# Patient Record
Sex: MALE | Race: WHITE | Employment: OTHER | URBAN - NONMETROPOLITAN AREA
[De-identification: names, ages, dates, MRNs, and addresses within clinical notes are randomized per-mention and may not be internally consistent; named-entity substitution may affect disease eponyms.]

---

## 2018-04-02 ENCOUNTER — HOSPITAL ENCOUNTER (OUTPATIENT)
Dept: MRI IMAGING | Facility: HOSPITAL | Age: 43
Discharge: HOME | End: 2018-04-02
Attending: FAMILY MEDICINE | Admitting: FAMILY MEDICINE
Payer: COMMERCIAL

## 2018-04-02 DIAGNOSIS — M54.50 LOW BACK PAIN: ICD-10-CM

## 2018-04-02 PROCEDURE — 72148 MRI LUMBAR SPINE W/O DYE: CPT | Mod: TC

## 2018-04-11 ENCOUNTER — TELEPHONE (OUTPATIENT)
Dept: INTERVENTIONAL RADIOLOGY/VASCULAR | Facility: HOSPITAL | Age: 43
End: 2018-04-11

## 2018-04-11 NOTE — TELEPHONE ENCOUNTER
STEROID INJECTION REMINDER CALL    Called to remind patient of appointment on 04/13/2018 at 1430    Patient has not had a flu shot in the last two weeks.  Patient has not had immunizations in the last two weeks.  Patient has not had a steroid injection in the last two weeks.  Patient is not currently on antibiotics.    Discussed after care, and explained that a  needs to accompany patient to these appointments.  Patient verbalized an understanding of the  requirement.

## 2018-04-13 ENCOUNTER — HOSPITAL ENCOUNTER (OUTPATIENT)
Dept: INTERVENTIONAL RADIOLOGY/VASCULAR | Facility: HOSPITAL | Age: 43
Discharge: HOME | End: 2018-04-13
Attending: FAMILY MEDICINE | Admitting: FAMILY MEDICINE
Payer: COMMERCIAL

## 2018-04-13 DIAGNOSIS — M51.369 DEGENERATION OF LUMBAR INTERVERTEBRAL DISC: ICD-10-CM

## 2018-04-13 PROCEDURE — 25000128 H RX IP 250 OP 636: Performed by: RADIOLOGY

## 2018-04-13 PROCEDURE — C1751 CATH, INF, PER/CENT/MIDLINE: HCPCS | Mod: TC

## 2018-04-13 RX ORDER — IOPAMIDOL 612 MG/ML
15 INJECTION, SOLUTION INTRATHECAL ONCE
Status: COMPLETED | OUTPATIENT
Start: 2018-04-13 | End: 2018-04-13

## 2018-04-13 RX ORDER — METHYLPREDNISOLONE ACETATE 80 MG/ML
INJECTION, SUSPENSION INTRA-ARTICULAR; INTRALESIONAL; INTRAMUSCULAR; SOFT TISSUE
Status: DISCONTINUED
Start: 2018-04-13 | End: 2018-04-14 | Stop reason: HOSPADM

## 2018-04-13 RX ORDER — METHYLPREDNISOLONE ACETATE 80 MG/ML
80 INJECTION, SUSPENSION INTRA-ARTICULAR; INTRALESIONAL; INTRAMUSCULAR; SOFT TISSUE ONCE
Status: COMPLETED | OUTPATIENT
Start: 2018-04-13 | End: 2018-04-13

## 2018-04-13 RX ADMIN — IOPAMIDOL 6 ML: 612 INJECTION, SOLUTION INTRATHECAL at 14:59

## 2018-04-13 RX ADMIN — METHYLPREDNISOLONE ACETATE 80 MG: 80 INJECTION, SUSPENSION INTRA-ARTICULAR; INTRALESIONAL; INTRAMUSCULAR; SOFT TISSUE at 14:59

## 2018-04-13 NOTE — IP AVS SNAPSHOT
[facility]    [address] [address]    Phone:  [phone]    Fax:  [phone]                                       After Visit Summary   4/13/2018    Samuel Bravo    MRN: [id number]           After Visit Summary Signature Page     I have received my discharge instructions, and my questions have been answered. I have discussed any challenges I see with this plan with the nurse or doctor.    ..........................................................................................................................................  Patient/Patient Representative Signature      ..........................................................................................................................................  Patient Representative Print Name and Relationship to Patient    ..................................................               ................................................  Date                                            Time    ..........................................................................................................................................  Reviewed by Signature/Title    ...................................................              ..............................................  Date                                                            Time

## 2018-04-13 NOTE — IP AVS SNAPSHOT
MRN:[id number]                      After Visit Summary   4/13/2018    Samuel Bravo    MRN: [id number]           Visit Information        Provider Department      4/13/2018  2:30 PM HIIRRAD; HIIR1 [facility]           Review of your medicines      UNREVIEWED medicines. Ask your doctor about these medicines        Dose / Directions    albuterol 108 (90 Base) MCG/ACT Inhaler   Commonly known as:  PROAIR HFA/PROVENTIL HFA/VENTOLIN HFA   Used for:  Bronchitis        Dose:  2 puff   Inhale 2 puffs into the lungs every 6 hours as needed for shortness of breath / dyspnea   Quantity:  1 Inhaler   Refills:  0       azithromycin 250 MG tablet   Commonly known as:  ZITHROMAX   Used for:  Bronchitis        Two tablets first day, then one tablet daily for four days.   Quantity:  6 tablet   Refills:  0       benzonatate 200 MG capsule   Commonly known as:  TESSALON   Used for:  Bronchitis        Dose:  200 mg   Take 1 capsule (200 mg) by mouth 3 times daily as needed for cough   Quantity:  21 capsule   Refills:  0       MULTIVITAMIN PO        Dose:  1 tablet   Take 1 tablet by mouth daily with food   Refills:  0       valACYclovir 500 MG tablet   Commonly known as:  VALTREX   Used for:  Herpes simplex virus infection        TAKE 1 TABLET TWICE A DAY BY MOUTH - GENERIC FOR VALTREX   Quantity:  60 tablet   Refills:  0                Protect others around you: Learn how to safely use, store and throw away your medicines at www.disposemymeds.org.         Follow-ups after your visit         Care Instructions        Further instructions from your care team       Home number on file [phone] (home)  Is it ok to leave a message at this number(s)? Yes    Dr. Lopez completed your procedure on 4/13/2018.    Current Pain Level (0-10 Scale): 4/10  Post Pain Level (0-10):  2/10    Radiology Discharge instructions for Steroid Injection    Activity Level:     Do not do any heavy activity or exercise for  "24 hours.   Do not drive for 4 hours after your injection.  Diet:   Return to your normal diet.  Medications:   If you have stopped taking your Aspirin, Coumadin/Warfarin, Ibuprofen, or any   other blood thinner for this procedure you may resume in the morning unless   your primary care provider has given you other instructions.    Diabetics may see an increase in blood sugar after steroid injections. If you are concerned about your blood sugar, please contact your family doctor.    Site Care:  Remove the bandage and bathe or shower the morning after the procedure.      Please allow two weeks to experience improvement in your pain.  If you have any further issues, please contact your provider.    Call your Primary Care Provider if you have the following (if your primary care provider is not available please seek emergency care):   Nausea with vomiting   Severe headache   Drowsiness or confusion   Redness or drainage at the injection or puncture site   Temperature over 101 degrees F   Other concerns   Worsening back pain   Stiff neck           Additional Information About Your Visit        [email or web address] Information     [employer] lets you send messages to your doctor, view your test results, renew your prescriptions, schedule appointments and more. To sign up, go to [email or web address] . Click on \"Log in\" on the left side of the screen, which will take you to the Welcome page. Then click on \"Sign up Now\" on the right side of the page.     You will be asked to enter the access code listed below, as well as some personal information. Please follow the directions to create your username and password.     Your access code is: [email or web address]  Expires: 2018  3:00 PM     Your access code will  in 90 days. If you need help or a new code, please call your [place] clinic or [phone].        Care EveryWhere ID     This is your Care EveryWhere ID. This could be used by other organizations to access your [place] medical " records  [id number]         Primary Care Provider Office Phone # Fax #    Benjie Ramos [phone]      Equal Access to Services     CLEMENT FLORES : Sarthak lupe mchugh sheryl Stahl, aidan rocíodavin, guanakito kaoctavio dominguez, [place] mejiapiyush sydnee. So [facility] [phone].    ATENCIÓN: Si habla español, tiene a agrawal disposición servicios gratuitos de asistencia lingüística. Llame al [phone].    We comply with applicable federal civil rights laws and [place] laws. We do not discriminate on the basis of race, color, national origin, age, disability, sex, sexual orientation, or gender identity.            Thank you!     Thank you for choosing [facility] for your care. Our goal is always to provide you with excellent care. Hearing back from our patients is one way we can continue to improve our services. Please take a few minutes to complete the written survey that you may receive in the mail after you visit with us. Thank you!             Medication List: This is a list of all your medications and when to take them. Check marks below indicate your daily home schedule. Keep this list as a reference.      Medications           Morning Afternoon Evening Bedtime As Needed    albuterol 108 (90 Base) MCG/ACT Inhaler   Commonly known as:  PROAIR HFA/PROVENTIL HFA/VENTOLIN HFA   Inhale 2 puffs into the lungs every 6 hours as needed for shortness of breath / dyspnea                                azithromycin 250 MG tablet   Commonly known as:  ZITHROMAX   Two tablets first day, then one tablet daily for four days.                                benzonatate 200 MG capsule   Commonly known as:  TESSALON   Take 1 capsule (200 mg) by mouth 3 times daily as needed for cough                                MULTIVITAMIN PO   Take 1 tablet by mouth daily with food                                valACYclovir 500 MG tablet   Commonly known as:  VALTREX   TAKE 1 TABLET TWICE A DAY BY MOUTH - GENERIC  FOR VALTREX

## 2018-04-13 NOTE — PROGRESS NOTES
Injection Documentation of Provider History    PER-PROVIDER HISTORY, Dr. HARRIS:  Is this for treatment of acute herpes zoster, post herpetic neuralgia, post-decompressive radiculitis, or post-surgical scarring?   No  Does the patient have radiculopathy or sciatica?  Yes  How long has the patient had any physical therapy, home therapy or chiropractor care?   YES.  How long has the patient taken NSaids or muscle relaxants?  NO .  Is there any history of systemic/local infection or unstable medical conditions?  No

## 2018-04-13 NOTE — DISCHARGE INSTRUCTIONS
Home number on file [phone] (home)  Is it ok to leave a message at this number(s)? Yes    Dr. Lopez completed your procedure on 4/13/2018.    Current Pain Level (0-10 Scale): 4/10  Post Pain Level (0-10):  2/10    Radiology Discharge instructions for Steroid Injection    Activity Level:     Do not do any heavy activity or exercise for 24 hours.   Do not drive for 4 hours after your injection.  Diet:   Return to your normal diet.  Medications:   If you have stopped taking your Aspirin, Coumadin/Warfarin, Ibuprofen, or any   other blood thinner for this procedure you may resume in the morning unless   your primary care provider has given you other instructions.    Diabetics may see an increase in blood sugar after steroid injections. If you are concerned about your blood sugar, please contact your family doctor.    Site Care:  Remove the bandage and bathe or shower the morning after the procedure.      Please allow two weeks to experience improvement in your pain.  If you have any further issues, please contact your provider.    Call your Primary Care Provider if you have the following (if your primary care provider is not available please seek emergency care):   Nausea with vomiting   Severe headache   Drowsiness or confusion   Redness or drainage at the injection or puncture site   Temperature over 101 degrees F   Other concerns   Worsening back pain   Stiff neck

## 2018-04-27 ENCOUNTER — TELEPHONE (OUTPATIENT)
Dept: INTERVENTIONAL RADIOLOGY/VASCULAR | Facility: HOSPITAL | Age: 43
End: 2018-04-27

## 2018-04-27 NOTE — TELEPHONE ENCOUNTER
INJECTION POST CALL    Procedure: BUBBA TL L3-4  Radiologist(s): Dr. Ld Lopez  Date of Procedure: 04/13/2018    I responded to the patient's questions/concerns.    Pre-procedure pain score was: 4 (See pre-procedure score)  Post-procedure pain score as of today is: 2  Percentage of pain reduction: 50%  Where is the pain? Lower back  Is the pain radiating? No, Patient states prior to the injection he had pain down both legs but that has completely resolved following the injection.  Is this new pain? No    Patient would like to pursue another injection, if recommended.     Patient will contact provider, Benjie Ramos if there are any issues.    GEORGIE PEREYRA

## 2018-05-25 ENCOUNTER — HOSPITAL ENCOUNTER (OUTPATIENT)
Dept: MRI IMAGING | Facility: HOSPITAL | Age: 43
Discharge: HOME | End: 2018-05-25
Attending: FAMILY MEDICINE | Admitting: FAMILY MEDICINE
Payer: COMMERCIAL

## 2018-05-25 DIAGNOSIS — M54.2 NECK PAIN: ICD-10-CM

## 2018-05-25 PROCEDURE — 72141 MRI NECK SPINE W/O DYE: CPT | Mod: TC

## 2018-07-11 ENCOUNTER — HOSPITAL ENCOUNTER (OUTPATIENT)
Dept: MRI IMAGING | Facility: HOSPITAL | Age: 43
Discharge: HOME | End: 2018-07-11
Attending: FAMILY MEDICINE | Admitting: FAMILY MEDICINE
Payer: COMMERCIAL

## 2018-07-11 DIAGNOSIS — M54.6 THORACIC BACK PAIN: ICD-10-CM

## 2018-07-11 DIAGNOSIS — M54.9 BACK PAIN DUE TO INJURY: ICD-10-CM

## 2018-07-11 PROCEDURE — 72146 MRI CHEST SPINE W/O DYE: CPT | Mod: TC

## 2018-07-27 ENCOUNTER — TELEPHONE (OUTPATIENT)
Dept: INTERVENTIONAL RADIOLOGY/VASCULAR | Facility: HOSPITAL | Age: 43
End: 2018-07-27

## 2018-07-27 NOTE — TELEPHONE ENCOUNTER
STEROID INJECTION REMINDER CALL    Called to remind patient of appointment on 07/30/2018 at 1330    Patient has not had a flu shot in the last two weeks.  Patient has not had immunizations in the last two weeks.  Patient has not had a steroid injection in the last two weeks.  Patient has not taken antibiotics in the past two weeks.    Discussed after care, and explained that a  needs to accompany patient to these appointments.  Patient verbalized an understanding of the  requirement.    Reminded the patient not to take any pain medication the day of the injection. Patient verbalized understanding.

## 2018-07-30 ENCOUNTER — HOSPITAL ENCOUNTER (OUTPATIENT)
Dept: INTERVENTIONAL RADIOLOGY/VASCULAR | Facility: HOSPITAL | Age: 43
Discharge: HOME | End: 2018-07-30
Attending: FAMILY MEDICINE | Admitting: FAMILY MEDICINE
Payer: COMMERCIAL

## 2018-07-30 DIAGNOSIS — M50.30 DDD (DEGENERATIVE DISC DISEASE), CERVICAL: ICD-10-CM

## 2018-07-30 PROCEDURE — 25000128 H RX IP 250 OP 636: Performed by: RADIOLOGY

## 2018-07-30 PROCEDURE — 62321 NJX INTERLAMINAR CRV/THRC: CPT | Mod: TC

## 2018-07-30 RX ORDER — LIDOCAINE HYDROCHLORIDE 10 MG/ML
INJECTION, SOLUTION EPIDURAL; INFILTRATION; INTRACAUDAL; PERINEURAL
Status: DISCONTINUED
Start: 2018-07-30 | End: 2018-07-31 | Stop reason: HOSPADM

## 2018-07-30 RX ORDER — METHYLPREDNISOLONE ACETATE 80 MG/ML
INJECTION, SUSPENSION INTRA-ARTICULAR; INTRALESIONAL; INTRAMUSCULAR; SOFT TISSUE
Status: DISCONTINUED
Start: 2018-07-30 | End: 2018-07-31 | Stop reason: HOSPADM

## 2018-07-30 RX ORDER — METHYLPREDNISOLONE ACETATE 80 MG/ML
80 INJECTION, SUSPENSION INTRA-ARTICULAR; INTRALESIONAL; INTRAMUSCULAR; SOFT TISSUE ONCE
Status: COMPLETED | OUTPATIENT
Start: 2018-07-30 | End: 2018-07-30

## 2018-07-30 RX ORDER — LIDOCAINE HYDROCHLORIDE 10 MG/ML
5 INJECTION, SOLUTION EPIDURAL; INFILTRATION; INTRACAUDAL; PERINEURAL ONCE
Status: COMPLETED | OUTPATIENT
Start: 2018-07-30 | End: 2018-07-30

## 2018-07-30 RX ORDER — IOPAMIDOL 612 MG/ML
15 INJECTION, SOLUTION INTRATHECAL ONCE
Status: COMPLETED | OUTPATIENT
Start: 2018-07-30 | End: 2018-07-30

## 2018-07-30 RX ADMIN — METHYLPREDNISOLONE ACETATE 80 MG: 80 INJECTION, SUSPENSION INTRA-ARTICULAR; INTRALESIONAL; INTRAMUSCULAR; SOFT TISSUE at 14:23

## 2018-07-30 RX ADMIN — LIDOCAINE HYDROCHLORIDE 3 ML: 10 INJECTION, SOLUTION EPIDURAL; INFILTRATION; INTRACAUDAL; PERINEURAL at 14:23

## 2018-07-30 RX ADMIN — IOPAMIDOL 3 ML: 612 INJECTION, SOLUTION INTRATHECAL at 14:24

## 2018-07-30 NOTE — PROGRESS NOTES
Injection Documentation of Provider History    PER-PROVIDER HISTORY  Is this for treatment of acute herpes zoster, post herpetic neuralgia, post-decompressive radiculitis, or post-surgical scarring?   No  Does the patient have radiculopathy or sciatica?  No  How long has the patient had any physical therapy, home therapy or chiropractor care?  YES weeks.  How long has the patient taken NSaids or muscle relaxants?  YES weeks.  Is there any history of systemic/local infection or unstable medical conditions?  No

## 2018-07-30 NOTE — IP AVS SNAPSHOT
MRN:[id number]                      After Visit Summary   7/30/2018    Samuel Bravo    MRN: [id number]           Visit Information        Provider Department      7/30/2018  1:30 PM [facility]; HIIR1 [facility]           Review of your medicines      UNREVIEWED medicines. Ask your doctor about these medicines        Dose / Directions    albuterol 108 (90 Base) MCG/ACT Inhaler   Commonly known as:  PROAIR HFA/PROVENTIL HFA/VENTOLIN HFA   Used for:  Bronchitis        Dose:  2 puff   Inhale 2 puffs into the lungs every 6 hours as needed for shortness of breath / dyspnea   Quantity:  1 Inhaler   Refills:  0       azithromycin 250 MG tablet   Commonly known as:  ZITHROMAX   Used for:  Bronchitis        Two tablets first day, then one tablet daily for four days.   Quantity:  6 tablet   Refills:  0       benzonatate 200 MG capsule   Commonly known as:  TESSALON   Used for:  Bronchitis        Dose:  200 mg   Take 1 capsule (200 mg) by mouth 3 times daily as needed for cough   Quantity:  21 capsule   Refills:  0       MULTIVITAMIN PO        Dose:  1 tablet   Take 1 tablet by mouth daily with food   Refills:  0       valACYclovir 500 MG tablet   Commonly known as:  VALTREX   Used for:  Herpes simplex virus infection        TAKE 1 TABLET TWICE A DAY BY MOUTH - GENERIC FOR VALTREX   Quantity:  60 tablet   Refills:  0                Protect others around you: Learn how to safely use, store and throw away your medicines at www.disposemymeds.org.         Follow-ups after your visit        Your next 10 appointments already scheduled     Aug 13, 2018 11:30 AM CDT   XR LUMBAR TRANSLAMINAR INJECTION with HIIR1, HIIRRAD   [facility] ([facility] )    [address] [place] [address] [phone]           For nerve root injection, please send or bring copies of any MRIs or other scans you have had.  Bring a list of your current medicines to your exam. (Include  vitamins, minerals and over-the-counter medicines.) Leave your valuables at home.  Plan to have someone drive you home afterward.  Stop taking the following medicines (but talk to your doctor first):   If you take blood thinners, you may need to stop taking them a few days before treatment. Talk to your doctor before stopping these medicines.Stop taking Coumadin (warfarin) 3 days before treatment. Restart the day after treatment.   If you take Plavix, Ticlid, Pletal or Persantine, please ask your doctor if you should stop these medicines. You may need extra tests on the morning of your scan.   If you take Xarelto (Rivaroxaban), you may need to stop taking it 24 hours before treatment. Talk to your doctor before stopping this medicine. Restart the day after treatment.  You may take your other medicines as normal.  Stop all food and drink (including water) 3 hours before your test or treatment.  Please tell the doctor:   If you are allergic to X-ray dye (contrast fluid).   If you may be pregnant.  Injections take about 30 to 45 minutes. Most people spend up to 2 hours in the clinic or hospital.  Please call the Imaging Department at your exam site with any questions.               Care Instructions        Further instructions from your care team       Home number on file [phone] (home)  Is it ok to leave a message at this number(s)? Yes    Dr uHrd completed your procedure on 7/30/2018.    Current Pain Level (0-10 Scale): 8/10  Post Pain Level (0-10):  4/10    Radiology Discharge instructions for Steroid Injection    Activity Level:     Do not do any heavy activity or exercise for 24 hours.   Do not drive for 4 hours after your injection.  Diet:   Return to your normal diet.  Medications:   If you have stopped taking your Aspirin, Coumadin/Warfarin, Ibuprofen, or any   other blood thinner for this procedure you may resume in the morning unless   your primary care provider has given you other  instructions.    Diabetics may see an increase in blood sugar after steroid injections. If you are concerned about your blood sugar, please contact your family doctor.    Site Care:  Remove the bandage and bathe or shower the morning after the procedure.      Please allow two weeks to experience improvement in your pain.  If you have any further issues, please contact your provider.    Call your Primary Care Provider if you have the following (if your primary care provider is not available please seek emergency care):   Nausea with vomiting   Severe headache   Drowsiness or confusion   Redness or drainage at the injection or puncture site   Temperature over 101 degrees F   Other concerns   Worsening back pain   Stiff neck           Additional Information About Your Visit        Care EveryWhere ID     This is your Care EveryWhere ID. This could be used by other organizations to access your [place] medical records  [id number]         Primary Care Provider Office Phone # Fax #    Benjie Ramos [phone]      Equal Access to Services     [facility]: Hadii lupe mchugh hadasho Soomaali, waaxda luqadaha, qaybta kaalmada alberto, denver escalante . So [facility] [phone].    ATENCIÓN: Si habla español, tiene a agrawal disposición servicios gratuitos de asistencia lingüística. Chris al [phone].    We comply with applicable federal civil rights laws and [place] laws. We do not discriminate on the basis of race, color, national origin, age, disability, sex, sexual orientation, or gender identity.            Thank you!     Thank you for choosing [facility] for your care. Our goal is always to provide you with excellent care. Hearing back from our patients is one way we can continue to improve our services. Please take a few minutes to complete the written survey that you may receive in the mail after you visit with us. Thank you!             Medication List: This is a list of all  your medications and when to take them. Check marks below indicate your daily home schedule. Keep this list as a reference.      Medications           Morning Afternoon Evening Bedtime As Needed    albuterol 108 (90 Base) MCG/ACT Inhaler   Commonly known as:  PROAIR HFA/PROVENTIL HFA/VENTOLIN HFA   Inhale 2 puffs into the lungs every 6 hours as needed for shortness of breath / dyspnea                                azithromycin 250 MG tablet   Commonly known as:  ZITHROMAX   Two tablets first day, then one tablet daily for four days.                                benzonatate 200 MG capsule   Commonly known as:  TESSALON   Take 1 capsule (200 mg) by mouth 3 times daily as needed for cough                                MULTIVITAMIN PO   Take 1 tablet by mouth daily with food                                valACYclovir 500 MG tablet   Commonly known as:  VALTREX   TAKE 1 TABLET TWICE A DAY BY MOUTH - GENERIC FOR VALTREX

## 2018-07-30 NOTE — DISCHARGE INSTRUCTIONS
Home number on file [phone] (home)  Is it ok to leave a message at this number(s)? Yes    Dr Hurd completed your procedure on 7/30/2018.    Current Pain Level (0-10 Scale): 8/10  Post Pain Level (0-10):  4/10    Radiology Discharge instructions for Steroid Injection    Activity Level:     Do not do any heavy activity or exercise for 24 hours.   Do not drive for 4 hours after your injection.  Diet:   Return to your normal diet.  Medications:   If you have stopped taking your Aspirin, Coumadin/Warfarin, Ibuprofen, or any   other blood thinner for this procedure you may resume in the morning unless   your primary care provider has given you other instructions.    Diabetics may see an increase in blood sugar after steroid injections. If you are concerned about your blood sugar, please contact your family doctor.    Site Care:  Remove the bandage and bathe or shower the morning after the procedure.      Please allow two weeks to experience improvement in your pain.  If you have any further issues, please contact your provider.    Call your Primary Care Provider if you have the following (if your primary care provider is not available please seek emergency care):   Nausea with vomiting   Severe headache   Drowsiness or confusion   Redness or drainage at the injection or puncture site   Temperature over 101 degrees F   Other concerns   Worsening back pain   Stiff neck

## 2018-07-30 NOTE — IP AVS SNAPSHOT
[facility]    [address] [address]    Phone:  [phone]    Fax:  [phone]                                       After Visit Summary   7/30/2018    Samuel Bravo    MRN: [id number]           After Visit Summary Signature Page     I have received my discharge instructions, and my questions have been answered. I have discussed any challenges I see with this plan with the nurse or doctor.    ..........................................................................................................................................  Patient/Patient Representative Signature      ..........................................................................................................................................  Patient Representative Print Name and Relationship to Patient    ..................................................               ................................................  Date                                            Time    ..........................................................................................................................................  Reviewed by Signature/Title    ...................................................              ..............................................  Date                                                            Time

## 2018-08-01 ENCOUNTER — TRANSFERRED RECORDS (OUTPATIENT)
Dept: HEALTH INFORMATION MANAGEMENT | Facility: CLINIC | Age: 43
End: 2018-08-01

## 2018-08-14 ENCOUNTER — TELEPHONE (OUTPATIENT)
Dept: INTERVENTIONAL RADIOLOGY/VASCULAR | Facility: HOSPITAL | Age: 43
End: 2018-08-14

## 2018-08-14 NOTE — TELEPHONE ENCOUNTER
[unfilled]    INJECTION POST CALL    Procedure: BUBBA TL C7-T1  Radiologist(s): Dr. Fernando Hurd  Date of Procedure: 07/30/2018    I responded to the patient's questions/concerns.    Pre-procedure pain score was: 8 (See pre-procedure score)  Post-procedure pain score as of today is: 8  Percentage of pain reduction: 0%  Where is the pain? Neck  Is the pain radiating? Yes: into both shoulders  Is this new pain? No    Patient reports that he had partial relief for a week, but his pain has returned back to baseline.  Patient will contact the provider if there are any issues.      GEORGIE PEREYRA

## 2018-09-12 ENCOUNTER — HOSPITAL ENCOUNTER (OUTPATIENT)
Dept: INTERVENTIONAL RADIOLOGY/VASCULAR | Facility: HOSPITAL | Age: 43
Discharge: HOME | End: 2018-09-12
Attending: PHYSICAL MEDICINE & REHABILITATION | Admitting: PHYSICAL MEDICINE & REHABILITATION
Payer: COMMERCIAL

## 2018-09-12 DIAGNOSIS — M47.816 SPONDYLOSIS OF LUMBAR REGION WITHOUT MYELOPATHY OR RADICULOPATHY: ICD-10-CM

## 2018-09-12 DIAGNOSIS — G89.4 CHRONIC PAIN SYNDROME: ICD-10-CM

## 2018-09-12 PROCEDURE — 25000125 ZZHC RX 250: Performed by: RADIOLOGY

## 2018-09-12 PROCEDURE — 64493 INJ PARAVERT F JNT L/S 1 LEV: CPT | Mod: TC,50

## 2018-09-12 PROCEDURE — 25000128 H RX IP 250 OP 636: Performed by: RADIOLOGY

## 2018-09-12 RX ORDER — ROPIVACAINE HYDROCHLORIDE 10 MG/ML
INJECTION EPIDURAL; INFILTRATION; PERINEURAL
Status: DISCONTINUED
Start: 2018-09-12 | End: 2018-09-13 | Stop reason: HOSPADM

## 2018-09-12 RX ORDER — LIDOCAINE HYDROCHLORIDE 10 MG/ML
10 INJECTION, SOLUTION EPIDURAL; INFILTRATION; INTRACAUDAL; PERINEURAL ONCE
Status: COMPLETED | OUTPATIENT
Start: 2018-09-12 | End: 2018-09-12

## 2018-09-12 RX ORDER — LIDOCAINE HYDROCHLORIDE 10 MG/ML
INJECTION, SOLUTION EPIDURAL; INFILTRATION; INTRACAUDAL; PERINEURAL
Status: DISCONTINUED
Start: 2018-09-12 | End: 2018-09-13 | Stop reason: HOSPADM

## 2018-09-12 RX ORDER — ROPIVACAINE HYDROCHLORIDE 10 MG/ML
10 INJECTION EPIDURAL; INFILTRATION; PERINEURAL ONCE
Status: COMPLETED | OUTPATIENT
Start: 2018-09-12 | End: 2018-09-12

## 2018-09-12 RX ADMIN — ROPIVACAINE HYDROCHLORIDE 8 ML: 10 INJECTION, SOLUTION EPIDURAL at 14:00

## 2018-09-12 RX ADMIN — LIDOCAINE HYDROCHLORIDE 8 ML: 10 INJECTION, SOLUTION EPIDURAL; INFILTRATION; INTRACAUDAL; PERINEURAL at 13:59

## 2018-09-12 NOTE — IP AVS SNAPSHOT
[facility]    [address] [address]    Phone:  [phone]    Fax:  [phone]                                       After Visit Summary   9/12/2018    Samuel Bravo    MRN: [id number]           After Visit Summary Signature Page     I have received my discharge instructions, and my questions have been answered. I have discussed any challenges I see with this plan with the nurse or doctor.    ..........................................................................................................................................  Patient/Patient Representative Signature      ..........................................................................................................................................  Patient Representative Print Name and Relationship to Patient    ..................................................               ................................................  Date                                   Time    ..........................................................................................................................................  Reviewed by Signature/Title    ...................................................              ..............................................  Date                                               Time          22EPIC Rev 08/18

## 2018-09-12 NOTE — IP AVS SNAPSHOT
MRN:[id number]                      After Visit Summary   9/12/2018    Samuel rBavo    MRN: [id number]           Visit Information        Provider Department      9/12/2018  1:30 PM HIIRRAD; HIIR1 [facility]           Review of your medicines      UNREVIEWED medicines. Ask your doctor about these medicines        Dose / Directions    albuterol 108 (90 Base) MCG/ACT inhaler   Commonly known as:  PROAIR HFA/PROVENTIL HFA/VENTOLIN HFA   Used for:  Bronchitis        Dose:  2 puff   Inhale 2 puffs into the lungs every 6 hours as needed for shortness of breath / dyspnea   Quantity:  1 Inhaler   Refills:  0       azithromycin 250 MG tablet   Commonly known as:  ZITHROMAX   Used for:  Bronchitis        Two tablets first day, then one tablet daily for four days.   Quantity:  6 tablet   Refills:  0       benzonatate 200 MG capsule   Commonly known as:  TESSALON   Used for:  Bronchitis        Dose:  200 mg   Take 1 capsule (200 mg) by mouth 3 times daily as needed for cough   Quantity:  21 capsule   Refills:  0       MULTIVITAMIN PO        Dose:  1 tablet   Take 1 tablet by mouth daily with food   Refills:  0       valACYclovir 500 MG tablet   Commonly known as:  VALTREX   Used for:  Herpes simplex virus infection        TAKE 1 TABLET TWICE A DAY BY MOUTH - GENERIC FOR VALTREX   Quantity:  60 tablet   Refills:  0                Protect others around you: Learn how to safely use, store and throw away your medicines at www.disposemymeds.org.         Follow-ups after your visit        Your next 10 appointments already scheduled     Nov 16, 2018  7:30 AM CST   (Arrive by 7:15 AM)   XR LUMBAR RADIOFREQ ABLATION BILATERAL with HIIR1, HIXRRN, HIIRRAD   [facility] ([facility] )    [address] [place] [address] [id number] [phone]           How do I prepare for my exam? (Food and drink instructions) Stop all food and drink (including water) 3 hours before your test or  treatment.  How do I prepare for my exam? (Other instructions) Stop taking the following medicines (but talk to your doctor first): * If you take blood thinners, you may need to stop taking them a few days before treatment. Talk to your doctor before stopping these medicines. Stop taking Coumadin (warfarin) 3 days before treatment. Restart the day after treatment. * If you take Plavix, Ticlid, Pletal or Persantine, please ask your doctor if you should stop these medicines. You may need extra tests on the morning of your scan. * If you take Xarelto (Rivaroxaban), you may need to stop taking it 24 hours before treatment. Talk to your doctor before stopping this medicine. Restart the day after treatment.  You may take your other medicines as normal.  What should I wear: Wear comfortable clothes.  How long does the exam take: Injections take about 30 to 45 minutes. Most people spend up to 2 hours in the clinic or hospital.  What should I bring: For nerve root injection, please send or bring copies of any MRIs or other scans you have had. Please bring a list of your current medicines to your exam. Include vitamins, minerals and over-the-counter medicines.  Do I need a :  Plan to have someone drive you home afterward.  What do I need to tell my doctor: Tell your doctor if: * You have ever had an allergic reaction to X-ray dye (contrast fluid). * If there s any chance you are pregnant.  What should I do after the exam: We may ask you to lie down for a short time before you go home. If you had a nerve shot and your arm or leg feels numb, you will stay for up to two hours until the numbness wears off. You may return to your normal activities the next day.  What is this test: A spinal shot is done in or near the spine. You may receive steroid medicine (to reduce swelling) or contrast fluid (dye that makes the area show up more clearly on X-rays).  Who should I call with questions: If you have any questions, please call  "the Imaging Department where you will have your exam. Directions, parking instructions, and other information is available on our website, [email or web address].               Care Instructions        Further instructions from your care team       Medial Branch Blocks Workup Discharge Instructions    To obtain authorization for a Rhizotomy (Radiofrequency Ablation), we are required to document the percent of relief of the Medial Branch Block injection after one hour.  Therefore, please call and report this to us an hour after your procedure.  If no one is available to answer, it will go to voicemail:  Please leave your name, phone number, and the relief you have obtained from your injection.    Report your pain level between 0 and 10 with 10 being the worst pain ever.  Please do not leave a range of relief; we require a specific number between 0 and 10.  Without this information, your injection pain relief will not be documented as required for your insurance company. Calling is a crucial step of the Medial Branch Block injection and Rhizotomy workup.  Please call [phone] at 300 PM    Thank you,  The Interventional Care Team         Additional Information About Your Visit        [facility] Information     [facility] lets you send messages to your doctor, view your test results, renew your prescriptions, schedule appointments and more. To sign up, go to [email or web address] . Click on \"Log in\" on the left side of the screen, which will take you to the Welcome page. Then click on \"Sign up Now\" on the right side of the page.     You will be asked to enter the access code listed below, as well as some personal information. Please follow the directions to create your username and password.     Your access code is: [email or web address]  Expires: 2018  2:01 PM     Your access code will  in 90 days. If you need help or a new code, please call your [facility] clinic or [phone].        Care EveryWhere ID     This is " your Care EveryWhere ID. This could be used by other organizations to access your [facility] medical records  [id number]         Primary Care Provider Office Phone # Fax #    Benjie Ramos [phone]      Equal Access to Services     KENDALSHARON SANDRA : Hadii lupe ku stoneo Soosmaniali, waaxda luqadaha, qaybta kaalmada adejordanyada, denver mejiapiyush . So [facility] [phone].    ATENCIÓN: Si habla español, tiene a agrawal disposición servicios gratuitos de asistencia lingüística. Llame al [phone].    We comply with applicable federal civil rights laws and [place] laws. We do not discriminate on the basis of race, color, national origin, age, disability, sex, sexual orientation, or gender identity.            Thank you!     Thank you for choosing [facility] for your care. Our goal is always to provide you with excellent care. Hearing back from our patients is one way we can continue to improve our services. Please take a few minutes to complete the written survey that you may receive in the mail after you visit with us. Thank you!             Medication List: This is a list of all your medications and when to take them. Check marks below indicate your daily home schedule. Keep this list as a reference.      Medications           Morning Afternoon Evening Bedtime As Needed    albuterol 108 (90 Base) MCG/ACT inhaler   Commonly known as:  PROAIR HFA/PROVENTIL HFA/VENTOLIN HFA   Inhale 2 puffs into the lungs every 6 hours as needed for shortness of breath / dyspnea                                azithromycin 250 MG tablet   Commonly known as:  ZITHROMAX   Two tablets first day, then one tablet daily for four days.                                benzonatate 200 MG capsule   Commonly known as:  TESSALON   Take 1 capsule (200 mg) by mouth 3 times daily as needed for cough                                MULTIVITAMIN PO   Take 1 tablet by mouth daily with food                                 valACYclovir 500 MG tablet   Commonly known as:  VALTREX   TAKE 1 TABLET TWICE A DAY BY MOUTH - GENERIC FOR VALTREX

## 2018-09-12 NOTE — DISCHARGE INSTRUCTIONS
Medial Branch Blocks Workup Discharge Instructions    To obtain authorization for a Rhizotomy (Radiofrequency Ablation), we are required to document the percent of relief of the Medial Branch Block injection after one hour.  Therefore, please call and report this to us an hour after your procedure.  If no one is available to answer, it will go to voicemail:  Please leave your name, phone number, and the relief you have obtained from your injection.    Report your pain level between 0 and 10 with 10 being the worst pain ever.  Please do not leave a range of relief; we require a specific number between 0 and 10.  Without this information, your injection pain relief will not be documented as required for your insurance company. Calling is a crucial step of the Medial Branch Block injection and Rhizotomy workup.  Please call [phone] at 300 PM    Thank you,  The Interventional Care Team

## 2018-10-01 ENCOUNTER — TRANSFERRED RECORDS (OUTPATIENT)
Dept: HEALTH INFORMATION MANAGEMENT | Facility: HOSPITAL | Age: 43
End: 2018-10-01

## 2018-10-01 LAB
CREAT SERPL-MCNC: 0.89 MG/DL (ref 0.8–1.5)
GLUCOSE SERPL-MCNC: 90 MG/DL (ref 60–99)
INR PPP: 1 (ref 0.9–1.2)
POTASSIUM SERPL-SCNC: 4.1 MEQ/L (ref 3.5–5.1)

## 2018-10-05 ENCOUNTER — TELEPHONE (OUTPATIENT)
Dept: INTERVENTIONAL RADIOLOGY/VASCULAR | Facility: HOSPITAL | Age: 43
End: 2018-10-05

## 2018-10-05 NOTE — TELEPHONE ENCOUNTER
"Patient called to verify he received denial letter and ensure he knows not to come for his appointment on 10/9.  He thought that he was still on for 10/9.  He was explained to that need to reschedule him, giving him a \"soft date\" in November to keep him on the schedule until this either gets resolved or denial is final.  If it gets resolved we will try to move him up in the schedule.  He stated he understood.    "

## 2018-11-28 ENCOUNTER — TRANSFERRED RECORDS (OUTPATIENT)
Dept: HEALTH INFORMATION MANAGEMENT | Facility: CLINIC | Age: 43
End: 2018-11-28

## 2018-12-05 RX ORDER — METHYLPREDNISOLONE ACETATE 80 MG/ML
80 INJECTION, SUSPENSION INTRA-ARTICULAR; INTRALESIONAL; INTRAMUSCULAR; SOFT TISSUE ONCE
Status: CANCELLED | OUTPATIENT
Start: 2018-12-11

## 2018-12-05 RX ORDER — LIDOCAINE HYDROCHLORIDE 10 MG/ML
10 INJECTION, SOLUTION EPIDURAL; INFILTRATION; INTRACAUDAL; PERINEURAL ONCE
Status: CANCELLED | OUTPATIENT
Start: 2018-12-11

## 2018-12-11 ENCOUNTER — HOSPITAL ENCOUNTER (OUTPATIENT)
Dept: ULTRASOUND IMAGING | Facility: HOSPITAL | Age: 43
Discharge: HOME | End: 2018-12-11
Attending: NEUROLOGICAL SURGERY | Admitting: NEUROLOGICAL SURGERY
Payer: COMMERCIAL

## 2018-12-11 VITALS
HEART RATE: 95 BPM | SYSTOLIC BLOOD PRESSURE: 166 MMHG | TEMPERATURE: 97.9 F | DIASTOLIC BLOOD PRESSURE: 128 MMHG | RESPIRATION RATE: 16 BRPM

## 2018-12-11 DIAGNOSIS — M54.81 OCCIPITAL NEURALGIA OF RIGHT SIDE: ICD-10-CM

## 2018-12-11 PROCEDURE — 76942 ECHO GUIDE FOR BIOPSY: CPT | Mod: TC

## 2018-12-11 PROCEDURE — 25000125 ZZHC RX 250: Performed by: RADIOLOGY

## 2018-12-11 PROCEDURE — 25000125 ZZHC RX 250

## 2018-12-11 RX ORDER — METHYLPREDNISOLONE ACETATE 80 MG/ML
INJECTION, SUSPENSION INTRA-ARTICULAR; INTRALESIONAL; INTRAMUSCULAR; SOFT TISSUE
Status: COMPLETED
Start: 2018-12-11 | End: 2018-12-11

## 2018-12-11 RX ORDER — METHYLPREDNISOLONE ACETATE 80 MG/ML
80 INJECTION, SUSPENSION INTRA-ARTICULAR; INTRALESIONAL; INTRAMUSCULAR; SOFT TISSUE ONCE
Status: COMPLETED | OUTPATIENT
Start: 2018-12-11 | End: 2018-12-11

## 2018-12-11 RX ORDER — LIDOCAINE HYDROCHLORIDE 10 MG/ML
10 INJECTION, SOLUTION EPIDURAL; INFILTRATION; INTRACAUDAL; PERINEURAL ONCE
Status: COMPLETED | OUTPATIENT
Start: 2018-12-11 | End: 2018-12-11

## 2018-12-11 RX ADMIN — METHYLPREDNISOLONE ACETATE 80 MG: 80 INJECTION, SUSPENSION INTRA-ARTICULAR; INTRALESIONAL; INTRAMUSCULAR; SOFT TISSUE at 12:55

## 2018-12-11 RX ADMIN — LIDOCAINE HYDROCHLORIDE 6.5 ML: 10 INJECTION, SOLUTION EPIDURAL; INFILTRATION; INTRACAUDAL; PERINEURAL at 12:54

## 2018-12-11 NOTE — PROGRESS NOTES
Patients BP high. He said it elevates when he comes to doctor or procedures. Used two different BP cuffs with out change. He denied any dizziness, lightheaded, pain , or blurred vision. He will be checkinfg his BP at home. He said it goes down when talen at home. Explained to call nurses DI office if any concerns or to follow up with his primary if BP stays high.

## 2018-12-12 ENCOUNTER — TELEPHONE (OUTPATIENT)
Dept: INTERVENTIONAL RADIOLOGY/VASCULAR | Facility: HOSPITAL | Age: 43
End: 2018-12-12

## 2018-12-12 NOTE — TELEPHONE ENCOUNTER
Pt states he is feeling okay, rates his pain as a 4 on a scale of 1-10 will call in two weeks to check again

## 2019-01-30 ENCOUNTER — HOSPITAL ENCOUNTER (OUTPATIENT)
Dept: INTERVENTIONAL RADIOLOGY/VASCULAR | Facility: HOSPITAL | Age: 44
Discharge: HOME | End: 2019-01-30
Attending: NEUROLOGICAL SURGERY | Admitting: NEUROLOGICAL SURGERY
Payer: COMMERCIAL

## 2019-01-30 DIAGNOSIS — M47.812 CERVICAL FACET JOINT SYNDROME: ICD-10-CM

## 2019-01-30 PROCEDURE — 25000125 ZZHC RX 250: Performed by: RADIOLOGY

## 2019-01-30 PROCEDURE — 64490 INJ PARAVERT F JNT C/T 1 LEV: CPT | Mod: TC,50

## 2019-01-30 PROCEDURE — 25500064 ZZH RX 255 OP 636: Performed by: RADIOLOGY

## 2019-01-30 RX ORDER — METHYLPREDNISOLONE ACETATE 80 MG/ML
80 INJECTION, SUSPENSION INTRA-ARTICULAR; INTRALESIONAL; INTRAMUSCULAR; SOFT TISSUE ONCE
Status: COMPLETED | OUTPATIENT
Start: 2019-01-30 | End: 2019-01-30

## 2019-01-30 RX ORDER — METHYLPREDNISOLONE ACETATE 80 MG/ML
INJECTION, SUSPENSION INTRA-ARTICULAR; INTRALESIONAL; INTRAMUSCULAR; SOFT TISSUE
Status: DISCONTINUED
Start: 2019-01-30 | End: 2019-01-31 | Stop reason: HOSPADM

## 2019-01-30 RX ORDER — IOPAMIDOL 612 MG/ML
50 INJECTION, SOLUTION INTRAVASCULAR ONCE
Status: COMPLETED | OUTPATIENT
Start: 2019-01-30 | End: 2019-01-30

## 2019-01-30 RX ORDER — LIDOCAINE HYDROCHLORIDE 10 MG/ML
5 INJECTION, SOLUTION EPIDURAL; INFILTRATION; INTRACAUDAL; PERINEURAL ONCE
Status: COMPLETED | OUTPATIENT
Start: 2019-01-30 | End: 2019-01-30

## 2019-01-30 RX ADMIN — LIDOCAINE HYDROCHLORIDE 4 ML: 10 INJECTION, SOLUTION EPIDURAL; INFILTRATION; INTRACAUDAL at 15:23

## 2019-01-30 RX ADMIN — METHYLPREDNISOLONE ACETATE 80 MG: 80 INJECTION, SUSPENSION INTRA-ARTICULAR; INTRALESIONAL; INTRAMUSCULAR; SOFT TISSUE at 15:22

## 2019-01-30 RX ADMIN — IOPAMIDOL 1 ML: 612 INJECTION, SOLUTION INTRAVENOUS at 15:23

## 2019-01-30 NOTE — IP AVS SNAPSHOT
[facility]  [address] [address]  Phone:  [phone]  Fax:  [phone]                                    After Visit Summary   1/30/2019    Samuel Bravo    MRN: [id number]           After Visit Summary Signature Page    I have received my discharge instructions, and my questions have been answered. I have discussed any challenges I see with this plan with the nurse or doctor.    ..........................................................................................................................................  Patient/Patient Representative Signature      ..........................................................................................................................................  Patient Representative Print Name and Relationship to Patient    ..................................................               ................................................  Date                                   Time    ..........................................................................................................................................  Reviewed by Signature/Title    ...................................................              ..............................................  Date                                               Time          22EPIC Rev 08/18

## 2019-01-30 NOTE — DISCHARGE INSTRUCTIONS
Home number on file [phone] (home)  Is it ok to leave a message at this number(s)? Yes    Dr. Lopez completed your procedure on 1/30/2019.    Current Pain Level (0-10 Scale): 3/10  Post Pain Level (0-10):  0/10    Radiology Discharge instructions for Steroid Injection    Activity Level:     Do not do any heavy activity or exercise for 24 hours.   Do not drive for 4 hours after your injection.  Diet:   Return to your normal diet.  Medications:   If you have stopped taking your Aspirin, Coumadin/Warfarin, Ibuprofen, or any   other blood thinner for this procedure you may resume in the morning unless   your primary care provider has given you other instructions.    Diabetics may see an increase in blood sugar after steroid injections. If you are concerned about your blood sugar, please contact your family doctor.    Site Care:  Remove the bandage and bathe or shower the morning after the procedure.      Please allow two weeks to experience improvement in your pain.  If you have any further issues, please contact your provider.    Call your Primary Care Provider if you have the following (if your primary care provider is not available please seek emergency care):   Nausea with vomiting   Severe headache   Drowsiness or confusion   Redness or drainage at the injection or puncture site   Temperature over 101 degrees F   Other concerns   Worsening back pain   Stiff neck

## 2019-02-13 ENCOUNTER — TELEPHONE (OUTPATIENT)
Dept: INTERVENTIONAL RADIOLOGY/VASCULAR | Facility: HOSPITAL | Age: 44
End: 2019-02-13

## 2019-02-13 NOTE — TELEPHONE ENCOUNTER
INJECTION POST CALL    Procedure: Bilateral cervical facet c7-t1  Radiologist(s): Dr. Ld Lopez  Date of Procedure:  1-30-19    The patient was not available by telephone.  No answer. Left msg

## 2019-02-13 NOTE — TELEPHONE ENCOUNTER
INJECTION POST CALL    Procedure: BILATERAL CERVICAL FACET C7-T1  Radiologist(s): Dr. Ld Lopez  Date of Procedure:  1-30-19    The patient had no questions or concerns.    Pre-procedure pain score was: 3 (See pre-procedure score)  Post-procedure pain score as of today is: 0  Where is the pain located? PAIN FREE      Patient will contact the provider if there are any issues.      Angeles Rivas

## 2019-03-11 ENCOUNTER — HOSPITAL ENCOUNTER (OUTPATIENT)
Dept: INTERVENTIONAL RADIOLOGY/VASCULAR | Facility: HOSPITAL | Age: 44
Discharge: HOME | End: 2019-03-11
Attending: PHYSICAL MEDICINE & REHABILITATION | Admitting: PHYSICAL MEDICINE & REHABILITATION
Payer: COMMERCIAL

## 2019-03-11 DIAGNOSIS — M47.819 FACET ARTHROPATHY: ICD-10-CM

## 2019-03-11 DIAGNOSIS — M46.90 INFLAMMATORY SPONDYLOPATHY (H): ICD-10-CM

## 2019-03-11 PROCEDURE — 25000128 H RX IP 250 OP 636: Performed by: RADIOLOGY

## 2019-03-11 PROCEDURE — 25000125 ZZHC RX 250: Performed by: RADIOLOGY

## 2019-03-11 PROCEDURE — 62323 NJX INTERLAMINAR LMBR/SAC: CPT | Mod: TC

## 2019-03-11 RX ORDER — METHYLPREDNISOLONE ACETATE 80 MG/ML
INJECTION, SUSPENSION INTRA-ARTICULAR; INTRALESIONAL; INTRAMUSCULAR; SOFT TISSUE
Status: DISCONTINUED
Start: 2019-03-11 | End: 2019-03-12 | Stop reason: HOSPADM

## 2019-03-11 RX ORDER — IOPAMIDOL 612 MG/ML
15 INJECTION, SOLUTION INTRATHECAL ONCE
Status: COMPLETED | OUTPATIENT
Start: 2019-03-11 | End: 2019-03-11

## 2019-03-11 RX ORDER — METHYLPREDNISOLONE ACETATE 80 MG/ML
80 INJECTION, SUSPENSION INTRA-ARTICULAR; INTRALESIONAL; INTRAMUSCULAR; SOFT TISSUE ONCE
Status: COMPLETED | OUTPATIENT
Start: 2019-03-11 | End: 2019-03-11

## 2019-03-11 RX ADMIN — METHYLPREDNISOLONE ACETATE 80 MG: 80 INJECTION, SUSPENSION INTRA-ARTICULAR; INTRALESIONAL; INTRAMUSCULAR; SOFT TISSUE at 15:30

## 2019-03-11 RX ADMIN — IOPAMIDOL 6 ML: 612 INJECTION, SOLUTION INTRATHECAL at 15:31

## 2019-03-11 NOTE — DISCHARGE INSTRUCTIONS
Home number on file [phone] (home)  Is it ok to leave a message at this number(s)? Yes    Dr. Lopez completed your procedure on 3/11/2019.    Current Pain Level (0-10 Scale): 3/10  Post Pain Level (0-10):  0/10    Radiology Discharge instructions for Steroid Injection    Activity Level:     Do not do any heavy activity or exercise for 24 hours.   Do not drive for 4 hours after your injection.  Diet:   Return to your normal diet.  Medications:   If you have stopped taking your Aspirin, Coumadin/Warfarin, Ibuprofen, or any   other blood thinner for this procedure you may resume in the morning unless   your primary care provider has given you other instructions.    Diabetics may see an increase in blood sugar after steroid injections. If you are concerned about your blood sugar, please contact your family doctor.    Site Care:  Remove the bandage and bathe or shower the morning after the procedure.      Please allow two weeks to experience improvement in your pain.  If you have any further issues, please contact your provider.    Call your Primary Care Provider if you have the following (if your primary care provider is not available please seek emergency care):   Nausea with vomiting   Severe headache   Drowsiness or confusion   Redness or drainage at the injection or puncture site   Temperature over 101 degrees F   Other concerns   Worsening back pain   Stiff neck

## 2019-03-11 NOTE — IP AVS SNAPSHOT
[facility]  [address] [address]  Phone:  [phone]  Fax:  [phone]                                    After Visit Summary   3/11/2019    Samuel Bravo    MRN: [id number]           After Visit Summary Signature Page    I have received my discharge instructions, and my questions have been answered. I have discussed any challenges I see with this plan with the nurse or doctor.    ..........................................................................................................................................  Patient/Patient Representative Signature      ..........................................................................................................................................  Patient Representative Print Name and Relationship to Patient    ..................................................               ................................................  Date                                   Time    ..........................................................................................................................................  Reviewed by Signature/Title    ...................................................              ..............................................  Date                                               Time          22EPIC Rev 08/18

## 2019-03-25 ENCOUNTER — TELEPHONE (OUTPATIENT)
Dept: INTERVENTIONAL RADIOLOGY/VASCULAR | Facility: HOSPITAL | Age: 44
End: 2019-03-25

## 2019-03-25 NOTE — TELEPHONE ENCOUNTER
INJECTION POST CALL    Procedure: Epidural TL L3-4  Radiologist(s): Dr. Ld Lopez  Date of Procedure:  3/11/19    Pre-procedure pain score was: 3 (See pre-procedure score)  Post-procedure pain score as of today is: 0  Percentage of pain improvement today?  100%  Where is the pain located? No pain  Is this new pain? No  Would you like to be scheduled for an additional injection?  No      The patient had no questions or concerns.    Patient will contact the provider if there are any issues.      Mireya Leblanc

## 2019-04-11 ENCOUNTER — HOSPITAL ENCOUNTER (OUTPATIENT)
Dept: INTERVENTIONAL RADIOLOGY/VASCULAR | Facility: HOSPITAL | Age: 44
Discharge: HOME | End: 2019-04-11
Attending: PHYSICAL MEDICINE & REHABILITATION | Admitting: PHYSICAL MEDICINE & REHABILITATION
Payer: COMMERCIAL

## 2019-04-11 DIAGNOSIS — M50.30 DDD (DEGENERATIVE DISC DISEASE), CERVICAL: ICD-10-CM

## 2019-04-11 DIAGNOSIS — M48.02 FORAMINAL STENOSIS OF CERVICAL REGION: ICD-10-CM

## 2019-04-11 PROCEDURE — 25000125 ZZHC RX 250: Performed by: RADIOLOGY

## 2019-04-11 PROCEDURE — 64490 INJ PARAVERT F JNT C/T 1 LEV: CPT | Mod: TC,RT

## 2019-04-11 PROCEDURE — 25000128 H RX IP 250 OP 636: Performed by: RADIOLOGY

## 2019-04-11 RX ORDER — ROPIVACAINE HYDROCHLORIDE 10 MG/ML
10 INJECTION EPIDURAL; INFILTRATION; PERINEURAL ONCE
Status: COMPLETED | OUTPATIENT
Start: 2019-04-11 | End: 2019-04-11

## 2019-04-11 RX ORDER — LIDOCAINE HYDROCHLORIDE 10 MG/ML
INJECTION, SOLUTION EPIDURAL; INFILTRATION; INTRACAUDAL; PERINEURAL
Status: DISPENSED
Start: 2019-04-11 | End: 2019-04-11

## 2019-04-11 RX ORDER — ROPIVACAINE HYDROCHLORIDE 10 MG/ML
INJECTION EPIDURAL; INFILTRATION; PERINEURAL
Status: DISPENSED
Start: 2019-04-11 | End: 2019-04-11

## 2019-04-11 RX ADMIN — LIDOCAINE HYDROCHLORIDE 1 ML: 10 INJECTION, SOLUTION EPIDURAL; INFILTRATION; INTRACAUDAL at 11:45

## 2019-04-11 RX ADMIN — ROPIVACAINE HYDROCHLORIDE 1 ML: 10 INJECTION, SOLUTION EPIDURAL at 11:46

## 2019-04-11 NOTE — PROGRESS NOTES
Medial Branch Blocks Workup Discharge Instructions    To obtain authorization for a Rhizotomy (Radiofrequency Ablation), we are required to document the percent of relief of the Medial Branch Block injection after one hour.  Therefore, please call and report this to us an hour after your procedure.  If no one is available to answer, it will go to voicemail:  Please leave your name, phone number, and the relief you have obtained from your injection.    Report your pain level between 1 and 10 with 10 being the worst pain ever.  Please do not leave a range of relief; we require a specific number between 1 and 10.  Without this information, your injection pain relief will not be documented as required for your insurance company. Calling is a crucial step of the Medial Branch Block injection and Rhizotomy workup.  Please call [phone] at 12:45 PM    Thank you,  The Interventional Care Team

## 2019-04-18 ENCOUNTER — TELEPHONE (OUTPATIENT)
Dept: INTERVENTIONAL RADIOLOGY/VASCULAR | Facility: HOSPITAL | Age: 44
End: 2019-04-18

## 2019-04-18 NOTE — TELEPHONE ENCOUNTER
Patient has completed his MBB's.  Per prior conversation, patient wanted to schedule occipital nerve block after MBB's were done.  Patient now wants to wait until after his rhizotomy to schedule this.

## 2019-04-29 ENCOUNTER — TELEPHONE (OUTPATIENT)
Dept: INTERVENTIONAL RADIOLOGY/VASCULAR | Facility: HOSPITAL | Age: 44
End: 2019-04-29

## 2019-04-29 ENCOUNTER — TRANSFERRED RECORDS (OUTPATIENT)
Dept: HEALTH INFORMATION MANAGEMENT | Facility: CLINIC | Age: 44
End: 2019-04-29

## 2019-04-29 LAB
CREAT SERPL-MCNC: 0.92 MG/DL (ref 0.8–1.5)
GFR SERPL CREATININE-BSD FRML MDRD: >60 ML/MIN/1.73M2
GLUCOSE SERPL-MCNC: 67 MG/DL (ref 60–99)
POTASSIUM SERPL-SCNC: 4.3 MEQ/L (ref 3.5–5.1)

## 2019-04-29 RX ORDER — FLUMAZENIL 0.1 MG/ML
0.2 INJECTION, SOLUTION INTRAVENOUS
Status: CANCELLED | OUTPATIENT
Start: 2019-04-30

## 2019-04-29 RX ORDER — NALOXONE HYDROCHLORIDE 0.4 MG/ML
.1-.4 INJECTION, SOLUTION INTRAMUSCULAR; INTRAVENOUS; SUBCUTANEOUS
Status: CANCELLED | OUTPATIENT
Start: 2019-04-30

## 2019-04-29 RX ORDER — SODIUM CHLORIDE 9 MG/ML
INJECTION, SOLUTION INTRAVENOUS CONTINUOUS
Status: CANCELLED | OUTPATIENT
Start: 2019-04-29

## 2019-04-29 RX ORDER — FENTANYL CITRATE 50 UG/ML
25-50 INJECTION, SOLUTION INTRAMUSCULAR; INTRAVENOUS EVERY 5 MIN PRN
Status: CANCELLED | OUTPATIENT
Start: 2019-04-30

## 2019-04-29 NOTE — TELEPHONE ENCOUNTER
"Patient called to remind of rhizotomy tomorrow.  Reminded to be here at 0730.  To have nothing to eat or drink after midnight and that he will be here between 1 and 4 hours afterwards.  Patient will have a ride home from \"[employer] [facility]\".    "

## 2019-04-30 ENCOUNTER — HOSPITAL ENCOUNTER (OUTPATIENT)
Dept: INTERVENTIONAL RADIOLOGY/VASCULAR | Facility: HOSPITAL | Age: 44
Discharge: HOME | End: 2019-04-30
Attending: PHYSICAL MEDICINE & REHABILITATION | Admitting: PHYSICAL MEDICINE & REHABILITATION
Payer: COMMERCIAL

## 2019-04-30 VITALS
OXYGEN SATURATION: 97 % | TEMPERATURE: 98.5 F | SYSTOLIC BLOOD PRESSURE: 119 MMHG | RESPIRATION RATE: 18 BRPM | DIASTOLIC BLOOD PRESSURE: 84 MMHG

## 2019-04-30 DIAGNOSIS — M50.30 DDD (DEGENERATIVE DISC DISEASE), CERVICAL: ICD-10-CM

## 2019-04-30 DIAGNOSIS — M48.02 FORAMINAL STENOSIS OF CERVICAL REGION: ICD-10-CM

## 2019-04-30 PROCEDURE — 25000128 H RX IP 250 OP 636: Performed by: RADIOLOGY

## 2019-04-30 PROCEDURE — 64633 DESTROY CERV/THOR FACET JNT: CPT | Mod: TC

## 2019-04-30 PROCEDURE — 27211063 XR CERVICAL RADIOFREQ ABLATION UNILATERAL: Mod: TC

## 2019-04-30 PROCEDURE — 25000125 ZZHC RX 250

## 2019-04-30 PROCEDURE — 25800030 ZZH RX IP 258 OP 636: Performed by: RADIOLOGY

## 2019-04-30 PROCEDURE — 25000128 H RX IP 250 OP 636

## 2019-04-30 RX ORDER — FLUMAZENIL 0.1 MG/ML
0.2 INJECTION, SOLUTION INTRAVENOUS
Status: DISCONTINUED | OUTPATIENT
Start: 2019-04-30 | End: 2019-05-01 | Stop reason: HOSPADM

## 2019-04-30 RX ORDER — FENTANYL CITRATE 50 UG/ML
25-50 INJECTION, SOLUTION INTRAMUSCULAR; INTRAVENOUS EVERY 5 MIN PRN
Status: DISCONTINUED | OUTPATIENT
Start: 2019-04-30 | End: 2019-05-01 | Stop reason: HOSPADM

## 2019-04-30 RX ORDER — METHYLPREDNISOLONE ACETATE 80 MG/ML
INJECTION, SUSPENSION INTRA-ARTICULAR; INTRALESIONAL; INTRAMUSCULAR; SOFT TISSUE
Status: COMPLETED
Start: 2019-04-30 | End: 2019-04-30

## 2019-04-30 RX ORDER — LIDOCAINE HYDROCHLORIDE 10 MG/ML
10 INJECTION, SOLUTION EPIDURAL; INFILTRATION; INTRACAUDAL; PERINEURAL ONCE
Status: DISCONTINUED | OUTPATIENT
Start: 2019-04-30 | End: 2019-05-01 | Stop reason: HOSPADM

## 2019-04-30 RX ORDER — OXYCODONE AND ACETAMINOPHEN 5; 325 MG/1; MG/1
1-2 TABLET ORAL EVERY 4 HOURS PRN
Qty: 60 TABLET | Refills: 0 | Status: SHIPPED | OUTPATIENT
Start: 2019-04-30 | End: 2019-05-28

## 2019-04-30 RX ORDER — NALOXONE HYDROCHLORIDE 0.4 MG/ML
.1-.4 INJECTION, SOLUTION INTRAMUSCULAR; INTRAVENOUS; SUBCUTANEOUS
Status: DISCONTINUED | OUTPATIENT
Start: 2019-04-30 | End: 2019-05-01 | Stop reason: HOSPADM

## 2019-04-30 RX ORDER — METHYLPREDNISOLONE ACETATE 80 MG/ML
80 INJECTION, SUSPENSION INTRA-ARTICULAR; INTRALESIONAL; INTRAMUSCULAR; SOFT TISSUE ONCE
Status: DISCONTINUED | OUTPATIENT
Start: 2019-04-30 | End: 2019-05-01 | Stop reason: HOSPADM

## 2019-04-30 RX ORDER — SODIUM CHLORIDE 9 MG/ML
INJECTION, SOLUTION INTRAVENOUS CONTINUOUS
Status: DISCONTINUED | OUTPATIENT
Start: 2019-04-30 | End: 2019-05-01 | Stop reason: HOSPADM

## 2019-04-30 RX ORDER — LIDOCAINE HYDROCHLORIDE 10 MG/ML
INJECTION, SOLUTION EPIDURAL; INFILTRATION; INTRACAUDAL; PERINEURAL
Status: COMPLETED
Start: 2019-04-30 | End: 2019-04-30

## 2019-04-30 RX ORDER — ROPIVACAINE HYDROCHLORIDE 10 MG/ML
10 INJECTION EPIDURAL; INFILTRATION; PERINEURAL ONCE
Status: DISCONTINUED | OUTPATIENT
Start: 2019-04-30 | End: 2019-05-01 | Stop reason: HOSPADM

## 2019-04-30 RX ORDER — ROPINIROLE 5 MG/1
5 TABLET, FILM COATED ORAL AT BEDTIME
COMMUNITY
End: 2019-05-28

## 2019-04-30 RX ORDER — ROPIVACAINE HYDROCHLORIDE 10 MG/ML
INJECTION EPIDURAL; INFILTRATION; PERINEURAL
Status: COMPLETED
Start: 2019-04-30 | End: 2019-04-30

## 2019-04-30 RX ADMIN — METHYLPREDNISOLONE ACETATE 80 MG: 80 INJECTION, SUSPENSION INTRA-ARTICULAR; INTRALESIONAL; INTRAMUSCULAR; SOFT TISSUE at 09:31

## 2019-04-30 RX ADMIN — MIDAZOLAM HYDROCHLORIDE 1 MG: 1 INJECTION, SOLUTION INTRAMUSCULAR; INTRAVENOUS at 09:26

## 2019-04-30 RX ADMIN — ROPIVACAINE HYDROCHLORIDE 3.5 ML: 10 INJECTION, SOLUTION EPIDURAL at 09:37

## 2019-04-30 RX ADMIN — FENTANYL CITRATE 50 MCG: 50 INJECTION, SOLUTION INTRAMUSCULAR; INTRAVENOUS at 09:03

## 2019-04-30 RX ADMIN — MIDAZOLAM HYDROCHLORIDE 1 MG: 1 INJECTION, SOLUTION INTRAMUSCULAR; INTRAVENOUS at 09:08

## 2019-04-30 RX ADMIN — FENTANYL CITRATE 50 MCG: 50 INJECTION, SOLUTION INTRAMUSCULAR; INTRAVENOUS at 09:00

## 2019-04-30 RX ADMIN — FENTANYL CITRATE 50 MCG: 50 INJECTION, SOLUTION INTRAMUSCULAR; INTRAVENOUS at 09:08

## 2019-04-30 RX ADMIN — SODIUM CHLORIDE: 9 INJECTION, SOLUTION INTRAVENOUS at 08:30

## 2019-04-30 RX ADMIN — MIDAZOLAM HYDROCHLORIDE 1 MG: 1 INJECTION, SOLUTION INTRAMUSCULAR; INTRAVENOUS at 09:00

## 2019-04-30 RX ADMIN — LIDOCAINE HYDROCHLORIDE 6 ML: 10 INJECTION, SOLUTION EPIDURAL; INFILTRATION; INTRACAUDAL at 09:40

## 2019-04-30 RX ADMIN — FENTANYL CITRATE 50 MCG: 50 INJECTION, SOLUTION INTRAMUSCULAR; INTRAVENOUS at 09:26

## 2019-04-30 RX ADMIN — MIDAZOLAM HYDROCHLORIDE 1 MG: 1 INJECTION, SOLUTION INTRAMUSCULAR; INTRAVENOUS at 09:03

## 2019-04-30 NOTE — IP AVS SNAPSHOT
[facility]  [address] [address]  Phone:  [phone]  Fax:  [phone]                                    After Visit Summary   4/30/2019    Samuel Bravo    MRN: [id number]           After Visit Summary Signature Page    I have received my discharge instructions, and my questions have been answered. I have discussed any challenges I see with this plan with the nurse or doctor.    ..........................................................................................................................................  Patient/Patient Representative Signature      ..........................................................................................................................................  Patient Representative Print Name and Relationship to Patient    ..................................................               ................................................  Date                                   Time    ..........................................................................................................................................  Reviewed by Signature/Title    ...................................................              ..............................................  Date                                               Time          22EPIC Rev 08/18

## 2019-04-30 NOTE — PROGRESS NOTES
Procedure: Image guided facet joint rhizotomy,cervical 5 and 6, right    There were  no complications and patient has no symptoms..      Tolerated procedure well.    Radiologist:Dr. Lopez    Time Out: Prior to the start of the procedure and with procedural staff participation, I verbally confirmed the patient s identity using two indicators, relevant allergies, that the procedure was appropriate and matched the consent or emergent situation, and that the correct equipment/implants were available. Immediately prior to starting the procedure I conducted the Time Out with the procedural staff and re-confirmed the patient s name, procedure, and site/side. (The Joint Commission universal protocol was followed.)  Yes    Position:  prone    Pain:  0    Sedation: IR Nurse monitored care. Moderate (conscious) sedation:  TIME IN MINUTES FROM START OF SEDATION TO END OF PROCEDURE:  40 minutes    Estimated Blood Loss: None     Condition: Stable    Comments: See dictated procedure note for full details     Kim Murphy RN

## 2019-04-30 NOTE — DISCHARGE INSTRUCTIONS
Rhizotomy Discharge Instructions    You will be discharged to home with [facility] by Walking      Do NOT drive or operate machinery today or engage in strenuous activity      Resume eating      Apply ice to this area today and warm moist heat tomorrow      Do NOT bath, shower only      We will be contacting you to follow up on your procedure on the next business day.      Please call your provider if you experience signs/symptoms of an infection: redness, swelling, drainage, fever (over 100.5). If you cannot reach the office please proceed to nearest ER.    IMMEDIATELY FOLLOWING YOUR RHIZOTOMY: Do not drive or operate machinery for the first twenty four hours after surgery. Do not make any important decisions for twenty four hours after surgery or while taking narcotic pain medications or sedatives. If you develop intractable nausea and vomiting or a severe headache please notify your doctor immediately.    FOLLOW-UP: Please make an appointment with your surgeon as instructed. You do not need to follow up with anesthesia unless specifically instructed to do so.    QUESTIONS?: Please feel free to call your physician or the hospital  if you have any questions, and they will be happy to assist you.

## 2019-04-30 NOTE — IP AVS SNAPSHOT
MRN:[id number]                      After Visit Summary   4/30/2019    Samuel Bravo    MRN: [id number]           Visit Information        Provider Department      4/30/2019  8:30 AM HIIRRAD; HIXRRN; HIIR1 [facility]           Review of your medicines      UNREVIEWED medicines. Ask your doctor about these medicines       Dose / Directions   rOPINIRole 5 MG tablet  Commonly known as:  REQUIP      Dose:  5 mg  Take 5 mg by mouth At Bedtime  Refills:  0     valACYclovir 500 MG tablet  Commonly known as:  VALTREX  Used for:  Herpes simplex virus infection      TAKE 1 TABLET TWICE A DAY BY MOUTH - GENERIC FOR VALTREX  Quantity:  60 tablet  Refills:  0        START taking       Dose / Directions   oxyCODONE-acetaminophen 5-325 MG tablet  Commonly known as:  PERCOCET  Used for:  DDD (degenerative disc disease), cervical      Dose:  1-2 tablet  Take 1-2 tablets by mouth every 4 hours as needed for pain  Quantity:  60 tablet  Refills:  0     tiZANidine 4 MG tablet  Commonly known as:  ZANAFLEX  Used for:  DDD (degenerative disc disease), cervical      Dose:  4-8 mg  Take 1-2 tablets (4-8 mg) by mouth 3 times daily as needed for muscle spasms  Quantity:  30 tablet  Refills:  1           Where to get your medicines      These medications were sent to [facility] [address] [address]    Phone:  [phone]   tiZANidine 4 MG tablet     Some of these will need a paper prescription and others can be bought over the counter. Ask your nurse if you have questions.    Bring a paper prescription for each of these medications  oxyCODONE-acetaminophen 5-325 MG tablet           Prescriptions were sent or printed at these locations (2 Prescriptions)            [facility] [address] [address]    Telephone:  [phone]   Fax:  [phone]   Hours:                   E-Prescribed (1 of 2)         tiZANidine (ZANAFLEX) 4 MG tablet                 Printed at Department/Unit printer (1 of 2)         oxyCODONE-acetaminophen (PERCOCET) 5-325 MG tablet                Protect others around you: Learn how to safely use, store and throw away your medicines at www.disposemymeds.org.       Follow-ups after your visit       Care Instructions       Further instructions from your care team       Rhizotomy Discharge Instructions    You will be discharged to home with Zina by Walking      Do NOT drive or operate machinery today or engage in strenuous activity      Resume eating      Apply ice to this area today and warm moist heat tomorrow      Do NOT bath, shower only      We will be contacting you to follow up on your procedure on the next business day.      Please call your provider if you experience signs/symptoms of an infection: redness, swelling, drainage, fever (over 100.5). If you cannot reach the office please proceed to nearest ER.    IMMEDIATELY FOLLOWING YOUR RHIZOTOMY: Do not drive or operate machinery for the first twenty four hours after surgery. Do not make any important decisions for twenty four hours after surgery or while taking narcotic pain medications or sedatives. If you develop intractable nausea and vomiting or a severe headache please notify your doctor immediately.    FOLLOW-UP: Please make an appointment with your surgeon as instructed. You do not need to follow up with anesthesia unless specifically instructed to do so.    QUESTIONS?: Please feel free to call your physician or the hospital  if you have any questions, and they will be happy to assist you.      Information about OPIOIDS    PRESCRIPTION OPIOIDS: WHAT YOU NEED TO KNOW   We gave you an opioid (narcotic) pain medicine. It is important to manage your pain, but opioids are not always the best choice. You should first try all the other options your care team gave you. Take this medicine for as  short a time (and as few doses) as possible.    Some activities can increase your pain, such as bandage changes or therapy sessions. It may help to take your pain medicine 30 to 60 minutes before these activities. Reduce your stress by getting enough sleep, working on hobbies you enjoy and practicing relaxation or meditation. Talk to your care team about ways to manage your pain beyond prescription opioids.    These medicines have risks:    DO NOT drive when on new or higher doses of pain medicine. These medicines can affect your alertness and reaction times, and you could be arrested for driving under the influence (DUI). If you need to use opioids long-term, talk to your care team about driving.    DO NOT operate heavy machinery    DO NOT do any other dangerous activities while taking these medicines.    DO NOT drink any alcohol while taking these medicines.     If the opioid prescribed includes acetaminophen, DO NOT take with any other medicines that contain acetaminophen. Read all labels carefully. Look for the word  acetaminophen  or  Tylenol.  Ask your pharmacist if you have questions or are unsure.    You can get addicted to pain medicines, especially if you have a history of addiction (chemical, alcohol or substance dependence). Talk to your care team about ways to reduce this risk.    All opioids tend to cause constipation. Drink plenty of water and eat foods that have a lot of fiber, such as fruits, vegetables, prune juice, apple juice and high-fiber cereal. Take a laxative (Miralax, milk of magnesia, Colace, Senna) if you don t move your bowels at least every other day. Other side effects include upset stomach, sleepiness, dizziness, throwing up, tolerance (needing more of the medicine to have the same effect), physical dependence and slowed breathing.    Store your pills in a secure place, locked if possible. We will not replace any lost or stolen medicine. If you don t finish your medicine, please throw  "away (dispose) as directed by your pharmacist. The [place] [employer]ution [employer] has more information about safe disposal: https://www.pca.[facility].[place].us/living-green/managing-unwanted-medications       Additional Information About Your Visit       MyChart Information    [employer] lets you send messages to your doctor, view your test results, renew your prescriptions, schedule appointments and more. To sign up, go to [email or web address] . Click on \"Log in\" on the left side of the screen, which will take you to the Welcome page. Then click on \"Sign up Now\" on the right side of the page.     You will be asked to enter the access code listed below, as well as some personal information. Please follow the directions to create your username and password.     Your access code is: [email or web address]  Expires: 2019 10:05 AM     Your access code will  in 60 days. If you need help or a new code, please call your [facility] clinic or [phone].       Care EveryWhere ID    This is your Care EveryWhere ID. This could be used by other organizations to access your [facility] medical records  [id number]       Your Vitals Were     Blood Pressure   115/72    Temperature   98.5  F (36.9  C) (Oral)    Respirations   18    Pulse Oximetry   97%           Primary Care Provider Office Phone # Fax #    Benjie Ramos [phone]      Equal Access to Services    [facility]: Hadii aad ku hadasho Soomaali, waaxda luqadaha, qaybta kaalmada adeegyada, denver escalante . So [facility] [phone].    ATENCIÓN: Si habla español, tiene a agrawal disposición servicios gratuitos de asistencia lingüística. Llame al [phone].    We comply with applicable federal civil rights laws and [place] laws. We do not discriminate on the basis of race, color, national origin, age, disability, sex, sexual orientation, or gender identity.           Thank you!    Thank you for choosing [facility] for your care. " Our goal is always to provide you with excellent care. Hearing back from our patients is one way we can continue to improve our services. Please take a few minutes to complete the written survey that you may receive in the mail after you visit with us. Thank you!            Medication List      Medications          Morning Afternoon Evening Bedtime As Needed    oxyCODONE-acetaminophen 5-325 MG tablet  Also known as:  PERCOCET  INSTRUCTIONS:  Take 1-2 tablets by mouth every 4 hours as needed for pain                     tiZANidine 4 MG tablet  Also known as:  ZANAFLEX  INSTRUCTIONS:  Take 1-2 tablets (4-8 mg) by mouth 3 times daily as needed for muscle spasms                       ASK your doctor about these medications          Morning Afternoon Evening Bedtime As Needed    rOPINIRole 5 MG tablet  Also known as:  REQUIP  INSTRUCTIONS:  Take 5 mg by mouth At Bedtime                     valACYclovir 500 MG tablet  Also known as:  VALTREX  INSTRUCTIONS:  TAKE 1 TABLET TWICE A DAY BY MOUTH - GENERIC FOR VALTREX

## 2019-04-30 NOTE — PROGRESS NOTES
[facility] Procedure Note        Sedation:      Performed by: Ld Lopez  Authorized by: Ld Lopez    Pre-Procedure Assessment done at 0830.    Expected Level:  Moderate Sedation    Indication:  Sedation is required to allow for Facet Rhizotomy    Consent obtained from patient after discussing the risks, benefits and alternatives.     Review of Anesthesia/Sedation History Completed: YES     Sleep Apnea Assessment Completed: YES    PO Intake:  Appropriately NPO for procedure    ASA Class:  Class 1 - HEALTHY PATIENT    Mallampati:  Grade 1:  Soft palate, uvula, tonsillar pillars, and posterior pharyngeal wall visible    Lungs: Lungs Clear with good breath sounds bilaterally.     Heart: Normal heart sounds and rate    History and physical reviewed and no updates needed. I have reviewed the lab findings, diagnostic data, medications, and the plan for sedation. I have determined this patient to be an appropriate candidate for the planned sedation and procedure and have reassessed the patient IMMEDIATELY PRIOR to sedation and procedure.       78

## 2019-04-30 NOTE — PROGRESS NOTES
VSS.  Oxygen sats 97% on room air.  Is alert, denies pain.  Total sedation received is 200 of fentanyl and 4 of versed. Bandaids (2) to neck clean dry and intact.

## 2019-05-01 ENCOUNTER — TELEPHONE (OUTPATIENT)
Dept: INTERVENTIONAL RADIOLOGY/VASCULAR | Facility: HOSPITAL | Age: 44
End: 2019-05-01

## 2019-05-14 ENCOUNTER — TELEPHONE (OUTPATIENT)
Dept: INTERVENTIONAL RADIOLOGY/VASCULAR | Facility: HOSPITAL | Age: 44
End: 2019-05-14

## 2019-05-14 NOTE — TELEPHONE ENCOUNTER
"Patient called to followup on cervical rhizotomy from April 30th.  Patient states it feels as if he has a \"sunburn\".  1/10.  No s/s of infection noted by the patient.  He hasnt taken any of his pain medications.  No further questions or concerns.  Patient has our telephone number if anything comes up.  Advised that we will be calling him in 3 months.    "

## 2019-05-23 ENCOUNTER — APPOINTMENT (OUTPATIENT)
Dept: ULTRASOUND IMAGING | Facility: HOSPITAL | Age: 44
End: 2019-05-23
Attending: PHYSICIAN ASSISTANT
Payer: COMMERCIAL

## 2019-05-23 ENCOUNTER — HOSPITAL ENCOUNTER (EMERGENCY)
Facility: HOSPITAL | Age: 44
Discharge: HOME | End: 2019-05-23
Attending: PHYSICIAN ASSISTANT | Admitting: PHYSICIAN ASSISTANT
Payer: COMMERCIAL

## 2019-05-23 VITALS
OXYGEN SATURATION: 95 % | HEIGHT: 67 IN | TEMPERATURE: 98.7 F | RESPIRATION RATE: 18 BRPM | DIASTOLIC BLOOD PRESSURE: 107 MMHG | HEART RATE: 85 BPM | WEIGHT: 200 LBS | BODY MASS INDEX: 31.39 KG/M2 | SYSTOLIC BLOOD PRESSURE: 139 MMHG

## 2019-05-23 DIAGNOSIS — R10.13 ABDOMINAL PAIN, EPIGASTRIC: ICD-10-CM

## 2019-05-23 LAB
ALBUMIN SERPL-MCNC: 3.8 G/DL (ref 3.4–5)
ALP SERPL-CCNC: 56 U/L (ref 40–150)
ALT SERPL W P-5'-P-CCNC: 45 U/L (ref 0–70)
ANION GAP SERPL CALCULATED.3IONS-SCNC: 4 MMOL/L (ref 3–14)
AST SERPL W P-5'-P-CCNC: 19 U/L (ref 0–45)
BASOPHILS # BLD AUTO: 0 10E9/L (ref 0–0.2)
BASOPHILS NFR BLD AUTO: 0.3 %
BILIRUB DIRECT SERPL-MCNC: 0.1 MG/DL (ref 0–0.2)
BILIRUB SERPL-MCNC: 0.5 MG/DL (ref 0.2–1.3)
BUN SERPL-MCNC: 11 MG/DL (ref 7–30)
CALCIUM SERPL-MCNC: 8.7 MG/DL (ref 8.5–10.1)
CHLORIDE SERPL-SCNC: 107 MMOL/L (ref 94–109)
CO2 SERPL-SCNC: 28 MMOL/L (ref 20–32)
CREAT SERPL-MCNC: 1.02 MG/DL (ref 0.66–1.25)
DIFFERENTIAL METHOD BLD: NORMAL
EOSINOPHIL # BLD AUTO: 0.2 10E9/L (ref 0–0.7)
EOSINOPHIL NFR BLD AUTO: 2.1 %
ERYTHROCYTE [DISTWIDTH] IN BLOOD BY AUTOMATED COUNT: 13.2 % (ref 10–15)
GFR SERPL CREATININE-BSD FRML MDRD: 88 ML/MIN/{1.73_M2}
GLUCOSE SERPL-MCNC: 104 MG/DL (ref 70–99)
HCT VFR BLD AUTO: 44.9 % (ref 40–53)
HGB BLD-MCNC: 15.5 G/DL (ref 13.3–17.7)
IMM GRANULOCYTES # BLD: 0 10E9/L (ref 0–0.4)
IMM GRANULOCYTES NFR BLD: 0.3 %
LACTATE BLD-SCNC: 0.8 MMOL/L (ref 0.7–2)
LIPASE SERPL-CCNC: 78 U/L (ref 73–393)
LYMPHOCYTES # BLD AUTO: 2.4 10E9/L (ref 0.8–5.3)
LYMPHOCYTES NFR BLD AUTO: 32.2 %
MCH RBC QN AUTO: 29.9 PG (ref 26.5–33)
MCHC RBC AUTO-ENTMCNC: 34.5 G/DL (ref 31.5–36.5)
MCV RBC AUTO: 87 FL (ref 78–100)
MONOCYTES # BLD AUTO: 0.6 10E9/L (ref 0–1.3)
MONOCYTES NFR BLD AUTO: 7.9 %
NEUTROPHILS # BLD AUTO: 4.3 10E9/L (ref 1.6–8.3)
NEUTROPHILS NFR BLD AUTO: 57.2 %
NRBC # BLD AUTO: 0 10*3/UL
NRBC BLD AUTO-RTO: 0 /100
PLATELET # BLD AUTO: 209 10E9/L (ref 150–450)
POTASSIUM SERPL-SCNC: 3.5 MMOL/L (ref 3.4–5.3)
PROT SERPL-MCNC: 7 G/DL (ref 6.8–8.8)
RBC # BLD AUTO: 5.18 10E12/L (ref 4.4–5.9)
SODIUM SERPL-SCNC: 139 MMOL/L (ref 133–144)
WBC # BLD AUTO: 7.5 10E9/L (ref 4–11)

## 2019-05-23 PROCEDURE — 80048 BASIC METABOLIC PNL TOTAL CA: CPT | Performed by: PHYSICIAN ASSISTANT

## 2019-05-23 PROCEDURE — 80076 HEPATIC FUNCTION PANEL: CPT | Performed by: PHYSICIAN ASSISTANT

## 2019-05-23 PROCEDURE — 83690 ASSAY OF LIPASE: CPT | Performed by: PHYSICIAN ASSISTANT

## 2019-05-23 PROCEDURE — 99284 EMERGENCY DEPT VISIT MOD MDM: CPT | Mod: Z6 | Performed by: PHYSICIAN ASSISTANT

## 2019-05-23 PROCEDURE — 76705 ECHO EXAM OF ABDOMEN: CPT | Mod: TC

## 2019-05-23 PROCEDURE — 83605 ASSAY OF LACTIC ACID: CPT | Performed by: PHYSICIAN ASSISTANT

## 2019-05-23 PROCEDURE — 85025 COMPLETE CBC W/AUTO DIFF WBC: CPT | Performed by: PHYSICIAN ASSISTANT

## 2019-05-23 PROCEDURE — 36415 COLL VENOUS BLD VENIPUNCTURE: CPT | Performed by: PHYSICIAN ASSISTANT

## 2019-05-23 PROCEDURE — 99284 EMERGENCY DEPT VISIT MOD MDM: CPT | Mod: 25

## 2019-05-23 RX ORDER — FAMOTIDINE 20 MG/1
20 TABLET, FILM COATED ORAL 2 TIMES DAILY
Qty: 20 TABLET | Refills: 0 | Status: ON HOLD | OUTPATIENT
Start: 2019-05-23 | End: 2019-05-30

## 2019-05-23 ASSESSMENT — ENCOUNTER SYMPTOMS
DIZZINESS: 0
ARTHRALGIAS: 0
ROS GI COMMENTS: + REFLUX
VOMITING: 1
HEADACHES: 0
BLOOD IN STOOL: 0
CHEST TIGHTNESS: 0
CHILLS: 1
FEVER: 1
NAUSEA: 1
CONSTIPATION: 0
HEMATURIA: 0
ABDOMINAL PAIN: 1
DIARRHEA: 1
ABDOMINAL DISTENTION: 0
COUGH: 0
DIFFICULTY URINATING: 0
DIAPHORESIS: 1
SHORTNESS OF BREATH: 0
MYALGIAS: 0
CONFUSION: 0
DYSURIA: 0

## 2019-05-23 ASSESSMENT — MIFFLIN-ST. JEOR: SCORE: 1745.82

## 2019-05-23 NOTE — ED AVS SNAPSHOT
[facility]  [address]  [place] [address]  Phone:  [phone]                                    Samuel Bravo   MRN: [id number]    Department:  [facility]   Date of Visit:  5/23/2019           After Visit Summary Signature Page    I have received my discharge instructions, and my questions have been answered. I have discussed any challenges I see with this plan with the nurse or doctor.    ..........................................................................................................................................  Patient/Patient Representative Signature      ..........................................................................................................................................  Patient Representative Print Name and Relationship to Patient    ..................................................               ................................................  Date                                   Time    ..........................................................................................................................................  Reviewed by Signature/Title    ...................................................              ..............................................  Date                                               Time          22EPIC Rev 08/18

## 2019-05-23 NOTE — ED NOTES
Discharge papers given to pt. Verbalizes understanding of discharge dx, follow up with PCP in one week, aware of gastroenterology consult.  VS as charted.  Rates pain 1/10. Discharged to home.

## 2019-05-23 NOTE — ED PROVIDER NOTES
History     Chief Complaint   Patient presents with     Abdominal Pain     Gallbladder pain      HPI  Samuel Bravo is a 44 year old male who presents with RUQ pain, chills, sweats, reflux, nausea, and vomiting since early this morning.  He has been having similar symptoms off and on for the past year, but has never had the severe pain and sweats before.  Symptoms usually awaken him from sleep in the morning.  Pizza and beer tend to trigger his symptoms.  Eating late at night also seems to trigger his symptoms.  Currently his pain has improved as well as his nausea.  He suspects his symptoms are related to his gallbladder as his father just had his out and had similar symptoms.  Has chronic loose/soft stools and states he thinks he has irritable bowel.  No blood in stools or black stools.      FAmily history of colon cancer.  Brother recently diagnosed with Crohn's disease.  He has never had an endoscopy.    Allergies:  Allergies   Allergen Reactions     Codeine      Penicillins        Problem List:    There are no active problems to display for this patient.       Past Medical History:    Past Medical History:   Diagnosis Date     Alcohol abuse, in remission 08/03/2012     Anabolic steroid abuse 08/03/2012     Anxiety state, unspecified 08/03/2012     Cannabis abuse, unspecified 08/03/2012     Depressive disorder, not elsewhere classified 08/03/2012     Obstructive sleep apnea (adult) (pediatric) 04/01/2011     Tobacco use disorder 04/01/2011     Umbilical hernia without mention of obstruction or gangrene 06/25/2008       Past Surgical History:    Past Surgical History:   Procedure Laterality Date     HC SUPERFICIAL CAUTERY BILATERAL MIDDLE TURBINATES  11/2011    turbinate reduction, Bilateral      HERNIA REPAIR, UMBILICAL  02/2009     I&D PERIANAL ABSCESS,SUPERFICIAL      EUA, and unroofing of perianal abscess     SEPTOPLASTY  11/2011     TONSILLECTOMY  1999     UVULOPALATOPHARYNGOPLASTY  11/2011  "      Family History:    Family History   Problem Relation Age of Onset     Heart Disease Father         atrial fibrillation, probably with a small MI      Breast Cancer Mother      Cancer - colorectal Paternal Grandfather      Cancer Mother         lung, (cause of death)      Substance Abuse Brother      Substance Abuse Brother      Colon Polyps Father      Chronic Obstructive Pulmonary Disease Mother      Other - See Comments Father         tobacco use      Brother recently diagnosed with Crohn's    Social History:  Marital Status:  Single [1]  Social History     Tobacco Use     Smoking status: Former Smoker     Packs/day: 1.00     Years: 25.00     Pack years: 25.00     Types: Cigarettes     Tobacco comment: Passive Exposure: No; Longest Tobacco Free: 3 months    Substance Use Topics     Alcohol use: No     Comment: Former, 2008     Drug use: Yes     Types: Marijuana        Medications:      famotidine (PEPCID) 20 MG tablet   tiZANidine (ZANAFLEX) 4 MG tablet   valACYclovir (VALTREX) 500 MG tablet   rOPINIRole (REQUIP) 5 MG tablet         Review of Systems   Constitutional: Positive for chills, diaphoresis and fever.   Respiratory: Negative for cough, chest tightness and shortness of breath.    Cardiovascular: Negative for chest pain.   Gastrointestinal: Positive for abdominal pain (RUQ, epigastric), diarrhea, nausea and vomiting. Negative for abdominal distention, blood in stool and constipation.        + reflux   Genitourinary: Negative for difficulty urinating, dysuria and hematuria.   Musculoskeletal: Negative for arthralgias and myalgias.   Skin: Negative for rash.   Neurological: Negative for dizziness and headaches.   Psychiatric/Behavioral: Negative for confusion.       Physical Exam   BP: 126/94  Heart Rate: 107  Temp: 98.7  F (37.1  C)  Resp: 20  Height: 170.2 cm (5' 7\")  Weight: 90.7 kg (200 lb)  SpO2: 95 %      Physical Exam   Exam:  Constitutional: healthy, alert and no distress  ENT: ENT exam normal, " no neck nodes or sinus tenderness, no scleral icteris  Cardiovascular: negative, PMI normal. No lifts, heaves, or thrills. RRR. No murmurs, clicks gallops or rub  Respiratory: CTAB, no wheezes, crackles, sats 95% on room air  Gastrointestinal: +BS, abdomen soft, mildly tender in RUQ and epigastric areas, no peritoneal signs, no hernias  : Deferred  Musculoskeletal: extremities normal- no gross deformities noted and normal muscle tone  Skin: no suspicious lesions or rashes and no jaundice  Neurologic: no focal neuro deficits, moving all 4 extremities, no facial asymmetry, follows commands  Psychiatric: mentation appears normal and affect normal/bright  Hematologic/Lymphatic/Immunologic: Normal cervical lymph nodes     ED Course     ED Course as of May 23 1218   Thu May 23, 2019   1054 Symptoms possibly due to gallbladder vs gastritis vs PUD.  Will obtain labs and RUQ ultrasound for further evaluation.       1151 Labs reviewed and no significant findings.  Ultrasound negative for gallbladder disease.  I suspect his symptoms are more related to gastritis vs PUD.          Procedures           Results for orders placed or performed during the hospital encounter of 05/23/19 (from the past 24 hour(s))   Basic metabolic panel   Result Value Ref Range    Sodium 139 133 - 144 mmol/L    Potassium 3.5 3.4 - 5.3 mmol/L    Chloride 107 94 - 109 mmol/L    Carbon Dioxide 28 20 - 32 mmol/L    Anion Gap 4 3 - 14 mmol/L    Glucose 104 (H) 70 - 99 mg/dL    Urea Nitrogen 11 7 - 30 mg/dL    Creatinine 1.02 0.66 - 1.25 mg/dL    GFR Estimate 88 >60 mL/min/[1.73_m2]    GFR Estimate If Black >90 >60 mL/min/[1.73_m2]    Calcium 8.7 8.5 - 10.1 mg/dL   CBC with platelets differential   Result Value Ref Range    WBC 7.5 4.0 - 11.0 10e9/L    RBC Count 5.18 4.4 - 5.9 10e12/L    Hemoglobin 15.5 13.3 - 17.7 g/dL    Hematocrit 44.9 40.0 - 53.0 %    MCV 87 78 - 100 fl    MCH 29.9 26.5 - 33.0 pg    MCHC 34.5 31.5 - 36.5 g/dL    RDW 13.2 10.0 - 15.0 %     Platelet Count 209 150 - 450 10e9/L    Diff Method Automated Method     % Neutrophils 57.2 %    % Lymphocytes 32.2 %    % Monocytes 7.9 %    % Eosinophils 2.1 %    % Basophils 0.3 %    % Immature Granulocytes 0.3 %    Nucleated RBCs 0 0 /100    Absolute Neutrophil 4.3 1.6 - 8.3 10e9/L    Absolute Lymphocytes 2.4 0.8 - 5.3 10e9/L    Absolute Monocytes 0.6 0.0 - 1.3 10e9/L    Absolute Eosinophils 0.2 0.0 - 0.7 10e9/L    Absolute Basophils 0.0 0.0 - 0.2 10e9/L    Abs Immature Granulocytes 0.0 0 - 0.4 10e9/L    Absolute Nucleated RBC 0.0    Hepatic panel   Result Value Ref Range    Bilirubin Direct 0.1 0.0 - 0.2 mg/dL    Bilirubin Total 0.5 0.2 - 1.3 mg/dL    Albumin 3.8 3.4 - 5.0 g/dL    Protein Total 7.0 6.8 - 8.8 g/dL    Alkaline Phosphatase 56 40 - 150 U/L    ALT 45 0 - 70 U/L    AST 19 0 - 45 U/L   Lactic acid whole blood   Result Value Ref Range    Lactic Acid 0.8 0.7 - 2.0 mmol/L   Lipase   Result Value Ref Range    Lipase 78 73 - 393 U/L   US Abdomen Limited (RUQ)    Narrative    PROCEDURES: US ABDOMEN LIMITED    HISTORY: RUQ pain, assess for cholecystitis    TECHNIQUE: Grayscale ultrasound of the upper abdomen was performed.    COMPARISON: none     FINDINGS:    MEASUREMENTS:   Liver length:  19 cm.   Common duct diameter:  0.6 cm.    LIVER: The liver is free of masses or biliary ductal enlargement. The  liver is echogenic consistent with fatty infiltration.    GALLBLADDER: No gallstones are seen. The gallbladder wall is not  thickened.    ABDOMINAL AORTA AND IVC: The abdominal aorta is normally tapering. The  inferior vena cava is patent.    PANCREAS: Portions of the head and body the pancreas were imaged and  appeared normal.    Right KIDNEY: The right kidney is free of masses or hydronephrosis.        Impression    IMPRESSION:     No gallstones. No biliary ductal enlargement.    DWAYNE KHAN MD       Medications - No data to display    Assessments & Plan (with Medical Decision Making)   44 year old  male presenting with abdominal pain, nausea, vomiting, concerned about his gallbladder.      Work up not consistent with gallbladder disease.  He has risk factors for gastritis and PUD.  Also has a family history of Crohns and colon cancer.  Recommending gastroenterology consultation, referral sent.  Trial of pepcid twice daily and follow up with primary care.  Keep food journal and track symptoms.  Discussed symptom triggers.  Education materials printed.  Patient will be discharged to home.  Return to ER if symptoms worsening or persist.  Patient agrees with diagnosis and plan.  All questions answered.      I have reviewed the nursing notes.    I have reviewed the findings, diagnosis, plan and need for follow up with the patient       Medication List      Started    famotidine 20 MG tablet  Commonly known as:  PEPCID  20 mg, Oral, 2 TIMES DAILY            Final diagnoses:   Abdominal pain, epigastric     Guera Delacruz PA-C  5/23/2019   [facility]     Guera Delacruz PA-C  05/23/19 1651

## 2019-05-23 NOTE — ED NOTES
Pt to the ED with c/o RUQ pain, heartburn, vomiting bile, sweats and chills.  States he has had 6-8 episodes in the past year.  Has seen his doctor and was told to take probiotics.  He has started probiotics.  States his s/sx happen when he eats pizza.  States he took tizanidine muscle relaxant after emesis today.  Assessment ijs complete.  Call light is given.  BP cuff and sat monitor on pt.

## 2019-05-23 NOTE — DISCHARGE INSTRUCTIONS
Take pepcid twice daily and follow up with primary care.  Would recommend a referral to gastroenterology as well for upper and lower endoscopy to further evaluate your symptoms and given your family history of colon cancer and Crohn's disease.

## 2019-05-24 ENCOUNTER — HOSPITAL ENCOUNTER (EMERGENCY)
Facility: HOSPITAL | Age: 44
Discharge: HOME | End: 2019-05-24
Attending: FAMILY MEDICINE | Admitting: FAMILY MEDICINE
Payer: COMMERCIAL

## 2019-05-24 ENCOUNTER — APPOINTMENT (OUTPATIENT)
Dept: CT IMAGING | Facility: HOSPITAL | Age: 44
End: 2019-05-24
Attending: PHYSICIAN ASSISTANT
Payer: COMMERCIAL

## 2019-05-24 VITALS
DIASTOLIC BLOOD PRESSURE: 118 MMHG | OXYGEN SATURATION: 97 % | SYSTOLIC BLOOD PRESSURE: 172 MMHG | HEART RATE: 80 BPM | TEMPERATURE: 97.6 F | RESPIRATION RATE: 16 BRPM

## 2019-05-24 DIAGNOSIS — R10.13 ABDOMINAL PAIN, EPIGASTRIC: ICD-10-CM

## 2019-05-24 LAB
ALBUMIN SERPL-MCNC: 4.1 G/DL (ref 3.4–5)
ALBUMIN UR-MCNC: 10 MG/DL
ALP SERPL-CCNC: 64 U/L (ref 40–150)
ALT SERPL W P-5'-P-CCNC: 46 U/L (ref 0–70)
ANION GAP SERPL CALCULATED.3IONS-SCNC: 7 MMOL/L (ref 3–14)
APPEARANCE UR: CLEAR
AST SERPL W P-5'-P-CCNC: 19 U/L (ref 0–45)
BASOPHILS # BLD AUTO: 0.1 10E9/L (ref 0–0.2)
BASOPHILS NFR BLD AUTO: 0.6 %
BILIRUB SERPL-MCNC: 0.5 MG/DL (ref 0.2–1.3)
BILIRUB UR QL STRIP: NEGATIVE
BUN SERPL-MCNC: 15 MG/DL (ref 7–30)
CALCIUM SERPL-MCNC: 9 MG/DL (ref 8.5–10.1)
CHLORIDE SERPL-SCNC: 107 MMOL/L (ref 94–109)
CO2 SERPL-SCNC: 27 MMOL/L (ref 20–32)
COLOR UR AUTO: ABNORMAL
CREAT SERPL-MCNC: 1.02 MG/DL (ref 0.66–1.25)
DIFFERENTIAL METHOD BLD: NORMAL
EOSINOPHIL # BLD AUTO: 0.2 10E9/L (ref 0–0.7)
EOSINOPHIL NFR BLD AUTO: 2.4 %
ERYTHROCYTE [DISTWIDTH] IN BLOOD BY AUTOMATED COUNT: 12.9 % (ref 10–15)
GFR SERPL CREATININE-BSD FRML MDRD: 88 ML/MIN/{1.73_M2}
GLUCOSE SERPL-MCNC: 112 MG/DL (ref 70–99)
GLUCOSE UR STRIP-MCNC: NEGATIVE MG/DL
HCT VFR BLD AUTO: 48.6 % (ref 40–53)
HGB BLD-MCNC: 16.8 G/DL (ref 13.3–17.7)
HGB UR QL STRIP: NEGATIVE
IMM GRANULOCYTES # BLD: 0 10E9/L (ref 0–0.4)
IMM GRANULOCYTES NFR BLD: 0.2 %
KETONES UR STRIP-MCNC: 10 MG/DL
LACTATE BLD-SCNC: 2.3 MMOL/L (ref 0.7–2)
LEUKOCYTE ESTERASE UR QL STRIP: NEGATIVE
LIPASE SERPL-CCNC: 121 U/L (ref 73–393)
LYMPHOCYTES # BLD AUTO: 1.9 10E9/L (ref 0.8–5.3)
LYMPHOCYTES NFR BLD AUTO: 22.3 %
MCH RBC QN AUTO: 30.1 PG (ref 26.5–33)
MCHC RBC AUTO-ENTMCNC: 34.6 G/DL (ref 31.5–36.5)
MCV RBC AUTO: 87 FL (ref 78–100)
MONOCYTES # BLD AUTO: 0.7 10E9/L (ref 0–1.3)
MONOCYTES NFR BLD AUTO: 7.9 %
NEUTROPHILS # BLD AUTO: 5.8 10E9/L (ref 1.6–8.3)
NEUTROPHILS NFR BLD AUTO: 66.6 %
NITRATE UR QL: NEGATIVE
NRBC # BLD AUTO: 0 10*3/UL
NRBC BLD AUTO-RTO: 0 /100
PH UR STRIP: 6.5 PH (ref 4.7–8)
PLATELET # BLD AUTO: 253 10E9/L (ref 150–450)
POTASSIUM SERPL-SCNC: 3.6 MMOL/L (ref 3.4–5.3)
PROT SERPL-MCNC: 7.7 G/DL (ref 6.8–8.8)
RBC # BLD AUTO: 5.58 10E12/L (ref 4.4–5.9)
SODIUM SERPL-SCNC: 141 MMOL/L (ref 133–144)
SOURCE: ABNORMAL
SP GR UR STRIP: >1.05 (ref 1–1.03)
UROBILINOGEN UR STRIP-MCNC: NORMAL MG/DL (ref 0–2)
WBC # BLD AUTO: 8.7 10E9/L (ref 4–11)

## 2019-05-24 PROCEDURE — 96374 THER/PROPH/DIAG INJ IV PUSH: CPT | Mod: XU

## 2019-05-24 PROCEDURE — 96361 HYDRATE IV INFUSION ADD-ON: CPT

## 2019-05-24 PROCEDURE — 99285 EMERGENCY DEPT VISIT HI MDM: CPT | Mod: 25

## 2019-05-24 PROCEDURE — 96376 TX/PRO/DX INJ SAME DRUG ADON: CPT

## 2019-05-24 PROCEDURE — 25800030 ZZH RX IP 258 OP 636: Performed by: PHYSICIAN ASSISTANT

## 2019-05-24 PROCEDURE — 96375 TX/PRO/DX INJ NEW DRUG ADDON: CPT

## 2019-05-24 PROCEDURE — 81003 URINALYSIS AUTO W/O SCOPE: CPT | Performed by: FAMILY MEDICINE

## 2019-05-24 PROCEDURE — 25500064 ZZH RX 255 OP 636: Performed by: RADIOLOGY

## 2019-05-24 PROCEDURE — 25000128 H RX IP 250 OP 636: Performed by: FAMILY MEDICINE

## 2019-05-24 PROCEDURE — 99284 EMERGENCY DEPT VISIT MOD MDM: CPT | Mod: Z6 | Performed by: PHYSICIAN ASSISTANT

## 2019-05-24 PROCEDURE — 74177 CT ABD & PELVIS W/CONTRAST: CPT | Mod: TC

## 2019-05-24 PROCEDURE — 80053 COMPREHEN METABOLIC PANEL: CPT | Performed by: FAMILY MEDICINE

## 2019-05-24 PROCEDURE — 83605 ASSAY OF LACTIC ACID: CPT | Performed by: FAMILY MEDICINE

## 2019-05-24 PROCEDURE — 25000125 ZZHC RX 250: Performed by: PHYSICIAN ASSISTANT

## 2019-05-24 PROCEDURE — 85025 COMPLETE CBC W/AUTO DIFF WBC: CPT | Performed by: FAMILY MEDICINE

## 2019-05-24 PROCEDURE — 25000132 ZZH RX MED GY IP 250 OP 250 PS 637: Performed by: PHYSICIAN ASSISTANT

## 2019-05-24 PROCEDURE — 25000128 H RX IP 250 OP 636: Performed by: PHYSICIAN ASSISTANT

## 2019-05-24 PROCEDURE — 87086 URINE CULTURE/COLONY COUNT: CPT | Performed by: FAMILY MEDICINE

## 2019-05-24 PROCEDURE — 36415 COLL VENOUS BLD VENIPUNCTURE: CPT | Performed by: FAMILY MEDICINE

## 2019-05-24 PROCEDURE — 83690 ASSAY OF LIPASE: CPT | Performed by: PHYSICIAN ASSISTANT

## 2019-05-24 RX ORDER — ONDANSETRON 2 MG/ML
4 INJECTION INTRAMUSCULAR; INTRAVENOUS EVERY 30 MIN PRN
Status: DISCONTINUED | OUTPATIENT
Start: 2019-05-24 | End: 2019-05-24 | Stop reason: HOSPADM

## 2019-05-24 RX ORDER — SUCRALFATE 1 G/1
1 TABLET ORAL ONCE
Status: COMPLETED | OUTPATIENT
Start: 2019-05-24 | End: 2019-05-24

## 2019-05-24 RX ORDER — SODIUM CHLORIDE 9 MG/ML
1000 INJECTION, SOLUTION INTRAVENOUS CONTINUOUS
Status: DISCONTINUED | OUTPATIENT
Start: 2019-05-24 | End: 2019-05-24 | Stop reason: HOSPADM

## 2019-05-24 RX ORDER — IOPAMIDOL 612 MG/ML
100 INJECTION, SOLUTION INTRAVASCULAR ONCE
Status: COMPLETED | OUTPATIENT
Start: 2019-05-24 | End: 2019-05-24

## 2019-05-24 RX ORDER — SUCRALFATE 1 G/1
1 TABLET ORAL 4 TIMES DAILY
Qty: 20 TABLET | Refills: 0 | Status: SHIPPED | OUTPATIENT
Start: 2019-05-24 | End: 2019-06-13

## 2019-05-24 RX ORDER — ONDANSETRON 4 MG/1
4 TABLET, FILM COATED ORAL EVERY 8 HOURS PRN
Qty: 6 TABLET | Refills: 0 | Status: SHIPPED | OUTPATIENT
Start: 2019-05-24 | End: 2019-05-28

## 2019-05-24 RX ORDER — HYDROMORPHONE HYDROCHLORIDE 1 MG/ML
0.5 INJECTION, SOLUTION INTRAMUSCULAR; INTRAVENOUS; SUBCUTANEOUS EVERY 30 MIN PRN
Status: DISCONTINUED | OUTPATIENT
Start: 2019-05-24 | End: 2019-05-24 | Stop reason: HOSPADM

## 2019-05-24 RX ORDER — SODIUM CHLORIDE 9 MG/ML
INJECTION, SOLUTION INTRAVENOUS CONTINUOUS
Status: DISCONTINUED | OUTPATIENT
Start: 2019-05-24 | End: 2019-05-24 | Stop reason: HOSPADM

## 2019-05-24 RX ADMIN — LIDOCAINE HYDROCHLORIDE 30 ML: 20 SOLUTION ORAL; TOPICAL at 12:26

## 2019-05-24 RX ADMIN — HYDROMORPHONE HYDROCHLORIDE 0.5 MG: 1 INJECTION, SOLUTION INTRAMUSCULAR; INTRAVENOUS; SUBCUTANEOUS at 12:10

## 2019-05-24 RX ADMIN — ONDANSETRON 4 MG: 2 INJECTION INTRAMUSCULAR; INTRAVENOUS at 12:09

## 2019-05-24 RX ADMIN — ONDANSETRON 4 MG: 2 INJECTION INTRAMUSCULAR; INTRAVENOUS at 09:40

## 2019-05-24 RX ADMIN — SUCRALFATE 1 G: 1 TABLET ORAL at 13:34

## 2019-05-24 RX ADMIN — SODIUM CHLORIDE 1000 ML: 9 INJECTION, SOLUTION INTRAVENOUS at 09:40

## 2019-05-24 RX ADMIN — IOPAMIDOL 100 ML: 612 INJECTION, SOLUTION INTRAVENOUS at 11:50

## 2019-05-24 RX ADMIN — SODIUM CHLORIDE: 9 INJECTION, SOLUTION INTRAVENOUS at 12:09

## 2019-05-24 RX ADMIN — HYDROMORPHONE HYDROCHLORIDE 0.5 MG: 1 INJECTION, SOLUTION INTRAMUSCULAR; INTRAVENOUS; SUBCUTANEOUS at 10:16

## 2019-05-24 ASSESSMENT — ENCOUNTER SYMPTOMS
ABDOMINAL PAIN: 1
SHORTNESS OF BREATH: 0
CHILLS: 1
FEVER: 1
CONFUSION: 0
NAUSEA: 1
DIARRHEA: 1
HEADACHES: 1
BLOOD IN STOOL: 0
DIAPHORESIS: 1
VOMITING: 1
ROS GI COMMENTS: NO HEMATEMESIS
DYSURIA: 0
HEMATURIA: 0

## 2019-05-24 NOTE — ED NOTES
Pt sipping on a soda, states he wants to make sure he is able to hold it down and would then be ready for discharge.

## 2019-05-24 NOTE — ED NOTES
DATE:  5/24/2019   TIME OF RECEIPT FROM LAB:  0945  LAB TEST:  Lactic Acid  LAB VALUE:  2.3  RESULTS GIVEN WITH READ-BACK TO (PROVIDER):  Leroy Hardin MD  TIME LAB VALUE REPORTED TO PROVIDER:   0945

## 2019-05-24 NOTE — ED PROVIDER NOTES
History     Chief Complaint   Patient presents with     Abdominal Pain     HPI  Samuel Bravo is a 44 year old male who returns to the ED with recurrent severe upper abdominal pain associated with nausea, vomiting, diarrhea, chills, and sweats.  He was seen yesterday.  Work up included normal WBC, lactate, LFTs, lipase, and normal gallbladder on ultrasound.  His symptoms had improved while in the ER and he was discharged home with probable gastritis vs PUD (see ER note from yesterday).  Currently having severe upper abdominal pain, nausea, and vomiting.  Vomiting small amounts of clear liquid.        Allergies:  Allergies   Allergen Reactions     Codeine      Penicillins        Problem List:    There are no active problems to display for this patient.       Past Medical History:    Past Medical History:   Diagnosis Date     Alcohol abuse, in remission 08/03/2012     Anabolic steroid abuse 08/03/2012     Anxiety state, unspecified 08/03/2012     Cannabis abuse, unspecified 08/03/2012     Depressive disorder, not elsewhere classified 08/03/2012     Obstructive sleep apnea (adult) (pediatric) 04/01/2011     Tobacco use disorder 04/01/2011     Umbilical hernia without mention of obstruction or gangrene 06/25/2008       Past Surgical History:    Past Surgical History:   Procedure Laterality Date     HC SUPERFICIAL CAUTERY BILATERAL MIDDLE TURBINATES  11/2011    turbinate reduction, Bilateral      HERNIA REPAIR, UMBILICAL  02/2009     I&D PERIANAL ABSCESS,SUPERFICIAL      EUA, and unroofing of perianal abscess     SEPTOPLASTY  11/2011     TONSILLECTOMY  1999     UVULOPALATOPHARYNGOPLASTY  11/2011       Family History:    Family History   Problem Relation Age of Onset     Heart Disease Father         atrial fibrillation, probably with a small MI      Breast Cancer Mother      Cancer - colorectal Paternal Grandfather      Cancer Mother         lung, (cause of death)      Substance Abuse Brother      Substance Abuse  Brother      Colon Polyps Father      Chronic Obstructive Pulmonary Disease Mother      Other - See Comments Father         tobacco use        Social History:  Marital Status:  Single [1]  Social History     Tobacco Use     Smoking status: Former Smoker     Packs/day: 1.00     Years: 25.00     Pack years: 25.00     Types: Cigarettes     Tobacco comment: Passive Exposure: No; Longest Tobacco Free: 3 months    Substance Use Topics     Alcohol use: No     Comment: Former, 2008     Drug use: Yes     Types: Marijuana        Medications:      ondansetron (ZOFRAN) 4 MG tablet   sucralfate (CARAFATE) 1 GM tablet   famotidine (PEPCID) 20 MG tablet   rOPINIRole (REQUIP) 5 MG tablet   tiZANidine (ZANAFLEX) 4 MG tablet   valACYclovir (VALTREX) 500 MG tablet         Review of Systems   Constitutional: Positive for chills, diaphoresis and fever.   Respiratory: Negative for shortness of breath.    Cardiovascular: Negative for chest pain.   Gastrointestinal: Positive for abdominal pain, diarrhea, nausea and vomiting. Negative for blood in stool.        No hematemesis   Genitourinary: Negative for dysuria and hematuria.   Skin: Negative for rash.   Neurological: Positive for headaches.   Psychiatric/Behavioral: Negative for confusion.       Physical Exam   BP: 159/92  Pulse: 80  Heart Rate: 85  Temp: 97.6  F (36.4  C)  Resp: 16  SpO2: 99 %      Physical Exam   Exam:  Constitutional: alert, moderate distress and sitting at edge of the bed hunched over holding his upper abdomen  Neck: Neck supple. No adenopathy. Thyroid symmetric, normal size,  Cardiovascular: RRR  Respiratory: non labored, clear bilaterally, O2 sats 99% on room air   Gastrointestinal: upper abdominal pain, soft, no generalized peritoneal signs  : Deferred  Musculoskeletal: extremities normal- no gross deformities noted and normal muscle tone  Skin: no suspicious lesions or rashes  Neurologic: no focal neuro deficits, moving all 4 extremities, no facial asymmetry,  follows commands   Psychiatric: mentation appears normal  Hematologic/Lymphatic/Immunologic: Normal cervical lymph nodes     ED Course     ED Course as of May 24 1805   Fri May 24, 2019   1113 Vitals stable.  Lactate mildly elevated, likely due to dehydration.  Start IVFs.  Obtain CT abdomen/pelvis.  IV dilaudid/zofran prn.        1220 CT reviewed and no acute findings.  Symptoms most consistent with gastritis vs gastroenteritis.  Trial of GI cocktail.        1306 Pain improved by 50 % with GI cocktail.  Will give a dose of carafate here.  Discharge home on carafate and zofran, with instructions to continue pepcid which was prescribed yesterday and maalox as needed.         Procedures         Results for orders placed or performed during the hospital encounter of 05/24/19 (from the past 24 hour(s))   CBC with platelets differential   Result Value Ref Range    WBC 8.7 4.0 - 11.0 10e9/L    RBC Count 5.58 4.4 - 5.9 10e12/L    Hemoglobin 16.8 13.3 - 17.7 g/dL    Hematocrit 48.6 40.0 - 53.0 %    MCV 87 78 - 100 fl    MCH 30.1 26.5 - 33.0 pg    MCHC 34.6 31.5 - 36.5 g/dL    RDW 12.9 10.0 - 15.0 %    Platelet Count 253 150 - 450 10e9/L    Diff Method Automated Method     % Neutrophils 66.6 %    % Lymphocytes 22.3 %    % Monocytes 7.9 %    % Eosinophils 2.4 %    % Basophils 0.6 %    % Immature Granulocytes 0.2 %    Nucleated RBCs 0 0 /100    Absolute Neutrophil 5.8 1.6 - 8.3 10e9/L    Absolute Lymphocytes 1.9 0.8 - 5.3 10e9/L    Absolute Monocytes 0.7 0.0 - 1.3 10e9/L    Absolute Eosinophils 0.2 0.0 - 0.7 10e9/L    Absolute Basophils 0.1 0.0 - 0.2 10e9/L    Abs Immature Granulocytes 0.0 0 - 0.4 10e9/L    Absolute Nucleated RBC 0.0    Comprehensive metabolic panel   Result Value Ref Range    Sodium 141 133 - 144 mmol/L    Potassium 3.6 3.4 - 5.3 mmol/L    Chloride 107 94 - 109 mmol/L    Carbon Dioxide 27 20 - 32 mmol/L    Anion Gap 7 3 - 14 mmol/L    Glucose 112 (H) 70 - 99 mg/dL    Urea Nitrogen 15 7 - 30 mg/dL     Creatinine 1.02 0.66 - 1.25 mg/dL    GFR Estimate 88 >60 mL/min/[1.73_m2]    GFR Estimate If Black >90 >60 mL/min/[1.73_m2]    Calcium 9.0 8.5 - 10.1 mg/dL    Bilirubin Total 0.5 0.2 - 1.3 mg/dL    Albumin 4.1 3.4 - 5.0 g/dL    Protein Total 7.7 6.8 - 8.8 g/dL    Alkaline Phosphatase 64 40 - 150 U/L    ALT 46 0 - 70 U/L    AST 19 0 - 45 U/L   Lactic acid whole blood   Result Value Ref Range    Lactic Acid 2.3 (H) 0.7 - 2.0 mmol/L   Lipase   Result Value Ref Range    Lipase 121 73 - 393 U/L   CT Abdomen Pelvis w Contrast    Narrative    EXAMINATION: CT ABDOMEN PELVIS W CONTRAST, 5/24/2019 11:57 AM    TECHNIQUE:  Helical CT images from the lung bases through the  symphysis pubis were obtained  with IV contrast. Contrast dose: Isovue  300 100mL    COMPARISON: none    HISTORY: Abd pain, gastroenteritis or colitis suspected    FINDINGS:    There is dependent atelectasis at the lung bases.    There is fatty infiltration of the liver. The liver is free of masses  or biliary ductal enlargement. No calcified gallstones are seen.    The the spleen and pancreas appear normal.    The adrenal glands are normal.    The right and left kidneys are free of masses or hydronephrosis.    The periaortic lymph nodes are normal in caliber.    No intraperitoneal masses or inflammatory changes are noted. The  appendix was visualized and appears normal    In the pelvis the bladder and rectum appear normal.    Scoliosis and degenerative changes are present in the thoracic and  lumbar spine      Impression    IMPRESSION: No acute abdominal or pelvic abnormality     DWAYNE KHAN MD   UA reflex to Microscopic and Culture   Result Value Ref Range    Color Urine Light Yellow     Appearance Urine Clear     Glucose Urine Negative NEG^Negative mg/dL    Bilirubin Urine Negative NEG^Negative    Ketones Urine 10 (A) NEG^Negative mg/dL    Specific Gravity Urine >1.050 (H) 1.003 - 1.035    Blood Urine Negative NEG^Negative    pH Urine 6.5 4.7 - 8.0 pH     Protein Albumin Urine 10 (A) NEG^Negative mg/dL    Urobilinogen mg/dL Normal 0.0 - 2.0 mg/dL    Nitrite Urine Negative NEG^Negative    Leukocyte Esterase Urine Negative NEG^Negative    Source Midstream Urine        Medications   0.9% sodium chloride BOLUS (0 mLs Intravenous Stopped 5/24/19 1124)   iopamidol (ISOVUE-300) IV solution 61% 100 mL (100 mLs Intravenous Given 5/24/19 1150)   sodium chloride (PF) 0.9% PF flush 60 mL (60 mLs Intravenous Given 5/24/19 1151)   lidocaine HCl (XYLOCAINE) 2 % 15 mL, alum & mag hydroxide-simethicone (MYLANTA ES/MAALOX  ES) 15 mL GI Cocktail (30 mLs Oral Given 5/24/19 1226)   sucralfate (CARAFATE) tablet 1 g (1 g Oral Given 5/24/19 1334)       Assessments & Plan (with Medical Decision Making)   44 year old male returned to ER with recurrent upper abdominal pain, nausea, vomiting.  He was seen the day before and had normal labs and normal ultrasound of his gallbladder.  He was discharged home on pepcid with referral to GI and follow up with his PCP for probable gastritis vs PUD.      Work up today included CTAP which was negative.  WBC and LFTs remain normal.  Afebrile.  Lactic acid mildly elevated and likely related to dehydration from vomiting and diarrhea.  Treated with IVFs.  Symptoms improved with GI cocktail, carafate, and zofran.  Symptoms likely related to gastritis vs PUD.  Possible gastroenteritis.  Recommend he continues his pepcid, follow up with PCP and GI.       Return to ER if symptoms worsening or persist.  Patient agrees with diagnosis and plan.  All questions answered.     I have reviewed the nursing notes.    I have reviewed the findings, diagnosis, plan and need for follow up with the patient.       Medication List      Started    ondansetron 4 MG tablet  Commonly known as:  ZOFRAN  4 mg, Oral, EVERY 8 HOURS PRN     sucralfate 1 GM tablet  Commonly known as:  CARAFATE  1 g, Oral, 4 TIMES DAILY            Final diagnoses:   Abdominal pain, epigastric      Guera Delacruz PA-C   5/24/2019   [facility]     Guera Delacruz PA-C  05/24/19 1846

## 2019-05-24 NOTE — ED NOTES
"Patient discharged home at this time. Patient given written and verbal discharge instructions regarding home care, f/u and medications. Patient verbalized understanding of all discharge instructions. States \"I'll be back if it gets worse\"    "

## 2019-05-24 NOTE — ED AVS SNAPSHOT
[facility]  [address]  [place] [address]  Phone:  [phone]                                    Samuel Bravo   MRN: [id number]    Department:  [facility]   Date of Visit:  5/24/2019           After Visit Summary Signature Page    I have received my discharge instructions, and my questions have been answered. I have discussed any challenges I see with this plan with the nurse or doctor.    ..........................................................................................................................................  Patient/Patient Representative Signature      ..........................................................................................................................................  Patient Representative Print Name and Relationship to Patient    ..................................................               ................................................  Date                                   Time    ..........................................................................................................................................  Reviewed by Signature/Title    ...................................................              ..............................................  Date                                               Time          22EPIC Rev 08/18

## 2019-05-24 NOTE — DISCHARGE INSTRUCTIONS
Please review handout on gastritis.  Continue pepcid twice daily, along with carafate 4 times daily, zofran every 8 hours as needed.  You may also take maalox as needed.  Please keep follow up with primary care.  Referral to gastroenterology was placed yesterday.

## 2019-05-26 LAB
BACTERIA SPEC CULT: ABNORMAL
SPECIMEN SOURCE: ABNORMAL

## 2019-05-28 ENCOUNTER — HOSPITAL ENCOUNTER (EMERGENCY)
Facility: HOSPITAL | Age: 44
Discharge: HOME | End: 2019-05-28
Attending: PHYSICIAN ASSISTANT | Admitting: PHYSICIAN ASSISTANT
Payer: COMMERCIAL

## 2019-05-28 ENCOUNTER — OFFICE VISIT (OUTPATIENT)
Dept: SURGERY | Facility: OTHER | Age: 44
End: 2019-05-28
Attending: SURGERY
Payer: COMMERCIAL

## 2019-05-28 VITALS
TEMPERATURE: 98 F | OXYGEN SATURATION: 96 % | SYSTOLIC BLOOD PRESSURE: 162 MMHG | RESPIRATION RATE: 18 BRPM | HEIGHT: 67 IN | DIASTOLIC BLOOD PRESSURE: 115 MMHG | BODY MASS INDEX: 30.29 KG/M2 | WEIGHT: 193 LBS

## 2019-05-28 VITALS
BODY MASS INDEX: 30.29 KG/M2 | HEIGHT: 67 IN | OXYGEN SATURATION: 97 % | WEIGHT: 193 LBS | DIASTOLIC BLOOD PRESSURE: 86 MMHG | HEART RATE: 90 BPM | SYSTOLIC BLOOD PRESSURE: 120 MMHG | TEMPERATURE: 99.4 F

## 2019-05-28 DIAGNOSIS — G43.A0 CYCLICAL VOMITING WITH NAUSEA, INTRACTABILITY OF VOMITING NOT SPECIFIED: Primary | ICD-10-CM

## 2019-05-28 DIAGNOSIS — K29.00 ACUTE GASTRITIS WITHOUT HEMORRHAGE, UNSPECIFIED GASTRITIS TYPE: ICD-10-CM

## 2019-05-28 LAB
ALBUMIN SERPL-MCNC: 4.4 G/DL (ref 3.4–5)
ALBUMIN UR-MCNC: 10 MG/DL
ALP SERPL-CCNC: 63 U/L (ref 40–150)
ALT SERPL W P-5'-P-CCNC: 79 U/L (ref 0–70)
ANION GAP SERPL CALCULATED.3IONS-SCNC: 8 MMOL/L (ref 3–14)
APPEARANCE UR: CLEAR
AST SERPL W P-5'-P-CCNC: 36 U/L (ref 0–45)
BILIRUB DIRECT SERPL-MCNC: 0.1 MG/DL (ref 0–0.2)
BILIRUB SERPL-MCNC: 0.7 MG/DL (ref 0.2–1.3)
BILIRUB UR QL STRIP: NEGATIVE
BUN SERPL-MCNC: 11 MG/DL (ref 7–30)
CALCIUM SERPL-MCNC: 9.1 MG/DL (ref 8.5–10.1)
CHLORIDE SERPL-SCNC: 110 MMOL/L (ref 94–109)
CO2 SERPL-SCNC: 23 MMOL/L (ref 20–32)
COLOR UR AUTO: YELLOW
CREAT SERPL-MCNC: 1.1 MG/DL (ref 0.66–1.25)
CRP SERPL-MCNC: <2.9 MG/L (ref 0–8)
ERYTHROCYTE [DISTWIDTH] IN BLOOD BY AUTOMATED COUNT: 12.7 % (ref 10–15)
ERYTHROCYTE [SEDIMENTATION RATE] IN BLOOD BY WESTERGREN METHOD: 4 MM/H (ref 0–15)
GFR SERPL CREATININE-BSD FRML MDRD: 80 ML/MIN/{1.73_M2}
GLUCOSE SERPL-MCNC: 103 MG/DL (ref 70–99)
GLUCOSE UR STRIP-MCNC: NEGATIVE MG/DL
HCT VFR BLD AUTO: 49.3 % (ref 40–53)
HGB BLD-MCNC: 17.2 G/DL (ref 13.3–17.7)
HGB UR QL STRIP: NEGATIVE
KETONES UR STRIP-MCNC: 80 MG/DL
LEUKOCYTE ESTERASE UR QL STRIP: NEGATIVE
LIPASE SERPL-CCNC: 63 U/L (ref 73–393)
MCH RBC QN AUTO: 29.5 PG (ref 26.5–33)
MCHC RBC AUTO-ENTMCNC: 34.9 G/DL (ref 31.5–36.5)
MCV RBC AUTO: 85 FL (ref 78–100)
NITRATE UR QL: NEGATIVE
PH UR STRIP: 5.5 PH (ref 4.7–8)
PLATELET # BLD AUTO: 272 10E9/L (ref 150–450)
POTASSIUM SERPL-SCNC: 3.3 MMOL/L (ref 3.4–5.3)
PROT SERPL-MCNC: 8 G/DL (ref 6.8–8.8)
RBC # BLD AUTO: 5.83 10E12/L (ref 4.4–5.9)
SODIUM SERPL-SCNC: 141 MMOL/L (ref 133–144)
SOURCE: ABNORMAL
SP GR UR STRIP: 1.02 (ref 1–1.03)
UROBILINOGEN UR STRIP-MCNC: NORMAL MG/DL (ref 0–2)
WBC # BLD AUTO: 9 10E9/L (ref 4–11)

## 2019-05-28 PROCEDURE — 36415 COLL VENOUS BLD VENIPUNCTURE: CPT | Performed by: PHYSICIAN ASSISTANT

## 2019-05-28 PROCEDURE — 96374 THER/PROPH/DIAG INJ IV PUSH: CPT

## 2019-05-28 PROCEDURE — 83690 ASSAY OF LIPASE: CPT | Performed by: PHYSICIAN ASSISTANT

## 2019-05-28 PROCEDURE — 96375 TX/PRO/DX INJ NEW DRUG ADDON: CPT

## 2019-05-28 PROCEDURE — 25000128 H RX IP 250 OP 636: Performed by: PHYSICIAN ASSISTANT

## 2019-05-28 PROCEDURE — 96361 HYDRATE IV INFUSION ADD-ON: CPT

## 2019-05-28 PROCEDURE — 85652 RBC SED RATE AUTOMATED: CPT | Performed by: PHYSICIAN ASSISTANT

## 2019-05-28 PROCEDURE — 99204 OFFICE O/P NEW MOD 45 MIN: CPT | Performed by: SURGERY

## 2019-05-28 PROCEDURE — 86140 C-REACTIVE PROTEIN: CPT | Performed by: PHYSICIAN ASSISTANT

## 2019-05-28 PROCEDURE — 80076 HEPATIC FUNCTION PANEL: CPT | Performed by: PHYSICIAN ASSISTANT

## 2019-05-28 PROCEDURE — 85027 COMPLETE CBC AUTOMATED: CPT | Performed by: PHYSICIAN ASSISTANT

## 2019-05-28 PROCEDURE — 99284 EMERGENCY DEPT VISIT MOD MDM: CPT | Mod: Z6 | Performed by: PHYSICIAN ASSISTANT

## 2019-05-28 PROCEDURE — 99284 EMERGENCY DEPT VISIT MOD MDM: CPT | Mod: 25

## 2019-05-28 PROCEDURE — G0463 HOSPITAL OUTPT CLINIC VISIT: HCPCS | Mod: 25

## 2019-05-28 PROCEDURE — 25800030 ZZH RX IP 258 OP 636: Performed by: PHYSICIAN ASSISTANT

## 2019-05-28 PROCEDURE — 80048 BASIC METABOLIC PNL TOTAL CA: CPT | Performed by: PHYSICIAN ASSISTANT

## 2019-05-28 PROCEDURE — 81003 URINALYSIS AUTO W/O SCOPE: CPT | Performed by: PHYSICIAN ASSISTANT

## 2019-05-28 RX ORDER — METOCLOPRAMIDE HYDROCHLORIDE 5 MG/ML
10 INJECTION INTRAMUSCULAR; INTRAVENOUS ONCE
Status: COMPLETED | OUTPATIENT
Start: 2019-05-28 | End: 2019-05-28

## 2019-05-28 RX ORDER — PROCHLORPERAZINE 25 MG
25 SUPPOSITORY, RECTAL RECTAL EVERY 12 HOURS PRN
Qty: 12 SUPPOSITORY | Refills: 0 | Status: SHIPPED | OUTPATIENT
Start: 2019-05-28 | End: 2019-09-25

## 2019-05-28 RX ORDER — DIPHENHYDRAMINE HYDROCHLORIDE 50 MG/ML
25 INJECTION INTRAMUSCULAR; INTRAVENOUS ONCE
Status: COMPLETED | OUTPATIENT
Start: 2019-05-28 | End: 2019-05-28

## 2019-05-28 RX ORDER — SODIUM CHLORIDE 9 MG/ML
1000 INJECTION, SOLUTION INTRAVENOUS CONTINUOUS
Status: DISCONTINUED | OUTPATIENT
Start: 2019-05-28 | End: 2019-05-28 | Stop reason: HOSPADM

## 2019-05-28 RX ORDER — MORPHINE SULFATE 4 MG/ML
4 INJECTION, SOLUTION INTRAMUSCULAR; INTRAVENOUS
Status: DISCONTINUED | OUTPATIENT
Start: 2019-05-28 | End: 2019-05-28 | Stop reason: HOSPADM

## 2019-05-28 RX ORDER — ONDANSETRON 2 MG/ML
4 INJECTION INTRAMUSCULAR; INTRAVENOUS EVERY 30 MIN PRN
Status: DISCONTINUED | OUTPATIENT
Start: 2019-05-28 | End: 2019-05-28 | Stop reason: HOSPADM

## 2019-05-28 RX ADMIN — SODIUM CHLORIDE 1000 ML: 9 INJECTION, SOLUTION INTRAVENOUS at 10:39

## 2019-05-28 RX ADMIN — DIPHENHYDRAMINE HYDROCHLORIDE 25 MG: 50 INJECTION INTRAMUSCULAR; INTRAVENOUS at 10:40

## 2019-05-28 RX ADMIN — MORPHINE SULFATE 4 MG: 4 INJECTION INTRAVENOUS at 11:31

## 2019-05-28 RX ADMIN — ONDANSETRON 4 MG: 2 INJECTION INTRAMUSCULAR; INTRAVENOUS at 10:40

## 2019-05-28 RX ADMIN — SODIUM CHLORIDE 1000 ML: 9 INJECTION, SOLUTION INTRAVENOUS at 11:32

## 2019-05-28 RX ADMIN — METOCLOPRAMIDE 10 MG: 5 INJECTION, SOLUTION INTRAMUSCULAR; INTRAVENOUS at 12:39

## 2019-05-28 ASSESSMENT — ENCOUNTER SYMPTOMS
RESPIRATORY NEGATIVE: 1
DIARRHEA: 1
CARDIOVASCULAR NEGATIVE: 1
NAUSEA: 1
VOMITING: 1
FEVER: 0
ABDOMINAL PAIN: 1
ANAL BLEEDING: 0
BLOOD IN STOOL: 0
EYES NEGATIVE: 1
MUSCULOSKELETAL NEGATIVE: 1
NEUROLOGICAL NEGATIVE: 1

## 2019-05-28 ASSESSMENT — MIFFLIN-ST. JEOR
SCORE: 1714.07
SCORE: 1714.07

## 2019-05-28 ASSESSMENT — PAIN SCALES - GENERAL: PAINLEVEL: MODERATE PAIN (5)

## 2019-05-28 NOTE — ED AVS SNAPSHOT
[facility]  [address]  [place] [address]  Phone:  [phone]                                    Samuel Bravo   MRN: [id number]    Department:  [facility]   Date of Visit:  5/28/2019           After Visit Summary Signature Page    I have received my discharge instructions, and my questions have been answered. I have discussed any challenges I see with this plan with the nurse or doctor.    ..........................................................................................................................................  Patient/Patient Representative Signature      ..........................................................................................................................................  Patient Representative Print Name and Relationship to Patient    ..................................................               ................................................  Date                                   Time    ..........................................................................................................................................  Reviewed by Signature/Title    ...................................................              ..............................................  Date                                               Time          22EPIC Rev 08/18

## 2019-05-28 NOTE — PATIENT INSTRUCTIONS
Thank you for allowing Dr Ray and our surgical team to participate in your care.  If you have a scheduling or an appointment question please contact Lisette, our Health Unit Coordinator, at her direct line [phone].   ALL nursing questions or concerns can be directed to your surgical nurse at: [phone]    You are scheduled for a:  Esophagogastroduodenoscopy with possible biopsy.  Your procedure date is: Thursday, May 30, 2019    Your time is yet to be determined.  Same day surgery will call you the day prior with your arrival time.      Nothing to eat after midnight the night prior to your procedure.  Nothing to drink 4 hours prior to your arrival time.        HOW TO PREPARE-        You need a friend or family member available to drive you home AND stay with you for 4 hours after you leave the hospital. You will not be allowed to drive yourself. IF you need to take a taxi or the bus you MUST have a responsible person to ride with you. YOUR PROCEDURE WILL BE CANCELLED IF YOU DO NOT HAVE A RESPONSIBLE ADULT TO DRIVE YOU HOME.       You need to call our Surgery Education Nurses 1-2 weeks prior to your surgery date at [phone] or toll free [phone]. Please have you medication and allergy lists ready.       Stop your aspirin or other NSAIDs(Ibuprofen, Motrin, Aleve, Celebrex, Naproxen, etc...) 7 days before your surgery.  Tylenol is safe.      Hospital admitting will call you the day before your surgery with your arrival time. If you are scheduled on a Monday admitting will call you the Friday before.      Please call your primary care physician if you should become ill within 24 hours of scheduled surgery. (ex.vomiting, diarrhea, fever)

## 2019-05-28 NOTE — ED NOTES
Face to face report given with opportunity to observe patient.    Report given to Herlinda VELASQUEZ   5/28/2019  11:10 AM

## 2019-05-28 NOTE — NURSING NOTE
"Chief Complaint   Patient presents with     Consult     Abdominal Pain       Initial /86   Pulse 90   Temp 99.4  F (37.4  C) (Tympanic)   Ht 1.702 m (5' 7\")   Wt 87.5 kg (193 lb)   SpO2 97%   BMI 30.23 kg/m   Estimated body mass index is 30.23 kg/m  as calculated from the following:    Height as of this encounter: 1.702 m (5' 7\").    Weight as of this encounter: 87.5 kg (193 lb).  Medication Reconciliation: complete    Celia Escoto LPN  "

## 2019-05-28 NOTE — ED NOTES
Met briefly with patient.  He states he smells vanilla perfume on me and did not want me in the room.  I did explain to him that I was not wearing perfume of any kind but he still didn't like it.  He needs a follow up appt with Dr Ramos.  I called [facility] and they will have him see Dr Ramos on June 6th at 2:30.  The clinic stated that they will talk to Dr Ramos and see if they can work him in sooner than that and they will call him.  Gave patient this information.  I also discussed with Care Coordinator Olya Sandoval [phone] and made a referral to her as I feel this patient may have several other needs that I was unable to assess today as he was not willing to discuss.    Kacy Aaron, DWIGHT   Care Transitions

## 2019-05-28 NOTE — ED NOTES
"Pt to ED with reports of vomiting since Thursday. Pt reports that he has been seen twice for this and has US and CT scans.  Pt reports they didn't find anything.  Pt reports \"I thinks its this valve\"  Pt reports to hx of drinking heavily and last drink was last week, smoke marijuana a few days ago. 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  Pt reports no blood in emesis.  Pt is dry heaving and emesis. Took Carafate this AM.  Pt ran out of Zofran, but reported that didn't help . Upper epigastric pains 10/10.  Pt reported \"I need to be scoped and Im not fucken leaving until I do\"  "

## 2019-05-28 NOTE — ED NOTES
Patient discharged home at this time. Patient given written and verbal discharge instructions regarding home care, f/u and medications. Patient verbalized understanding of all discharge instructions. Aware RX at [facility]. Aware to go to surgery department to be seen after discharge.

## 2019-05-28 NOTE — ED PROVIDER NOTES
"  History     Chief Complaint   Patient presents with     Nausea & Vomiting     \"for past 6 days\"      HPI  Samuel Bravo is a 44 year old male who presents to ED with severe epigastric pain with vomiting and diarrhea that started 6 days ago.  Denies fever, bloody stools.  Feels dehydrated.  Reports sensation of knife into the gut in epigastric region.  No prior problems with rectal bleeding or ulcer reported.  Patient was seen in ED on 23rd and 24th and had unremarkable labs, CT and abd US.  Pain has been more severe in AM when he attempts to eat or drink anything.  Symptoms have not been improved at all with Zofran, Carafate or PPI.  Denies blood in vomit.      Allergies:  Allergies   Allergen Reactions     Codeine      Penicillins        Problem List:    There are no active problems to display for this patient.       Past Medical History:    Past Medical History:   Diagnosis Date     Alcohol abuse, in remission 08/03/2012     Anabolic steroid abuse 08/03/2012     Anxiety state, unspecified 08/03/2012     Cannabis abuse, unspecified 08/03/2012     Depressive disorder, not elsewhere classified 08/03/2012     Obstructive sleep apnea (adult) (pediatric) 04/01/2011     Tobacco use disorder 04/01/2011     Umbilical hernia without mention of obstruction or gangrene 06/25/2008       Past Surgical History:    Past Surgical History:   Procedure Laterality Date     HC SUPERFICIAL CAUTERY BILATERAL MIDDLE TURBINATES  11/2011    turbinate reduction, Bilateral      HERNIA REPAIR, UMBILICAL  02/2009     I&D PERIANAL ABSCESS,SUPERFICIAL      EUA, and unroofing of perianal abscess     SEPTOPLASTY  11/2011     TONSILLECTOMY  1999     UVULOPALATOPHARYNGOPLASTY  11/2011       Family History:    Family History   Problem Relation Age of Onset     Heart Disease Father         atrial fibrillation, probably with a small MI      Breast Cancer Mother      Cancer - colorectal Paternal Grandfather      Cancer Mother         lung, " "(cause of death)      Substance Abuse Brother      Substance Abuse Brother      Colon Polyps Father      Chronic Obstructive Pulmonary Disease Mother      Other - See Comments Father         tobacco use        Social History:  Marital Status:  Single [1]  Social History     Tobacco Use     Smoking status: Former Smoker     Packs/day: 1.00     Years: 25.00     Pack years: 25.00     Types: Cigarettes     Tobacco comment: Passive Exposure: No; Longest Tobacco Free: 3 months    Substance Use Topics     Alcohol use: No     Comment: Former, 2008     Drug use: Yes     Types: Marijuana        Medications:      famotidine (PEPCID) 20 MG tablet   prochlorperazine (COMPAZINE) 25 MG suppository   sucralfate (CARAFATE) 1 GM tablet   ondansetron (ZOFRAN) 4 MG tablet   rOPINIRole (REQUIP) 5 MG tablet   tiZANidine (ZANAFLEX) 4 MG tablet         Review of Systems   Constitutional: Negative for fever.   HENT: Negative.    Eyes: Negative.    Respiratory: Negative.    Cardiovascular: Negative.    Gastrointestinal: Positive for abdominal pain, diarrhea, nausea and vomiting. Negative for anal bleeding and blood in stool.   Genitourinary: Negative.    Musculoskeletal: Negative.    Skin: Negative.    Neurological: Negative.        Physical Exam   BP: (!) 162/111  Heart Rate: 88  Temp: 97.3  F (36.3  C)  Resp: 22  Height: 170.2 cm (5' 7\")  Weight: 87.5 kg (193 lb)  SpO2: 99 %      Physical Exam   Constitutional: He appears well-developed. No distress.   Appears to be in significant pain holding epigastric region.     HENT:   Head: Atraumatic.   Mouth/Throat: Oropharynx is clear and moist.   Eyes: Pupils are equal, round, and reactive to light. Conjunctivae and EOM are normal. No scleral icterus.   Neck: Normal range of motion. Neck supple.   Cardiovascular: Normal heart sounds and intact distal pulses.   Pulmonary/Chest: Breath sounds normal. No respiratory distress.   Abdominal: Soft. Bowel sounds are normal. There is tenderness (Severe " tenderness to palpation in epigastric region.).   Musculoskeletal: He exhibits no edema or tenderness.   Skin: Skin is warm. No rash noted. He is not diaphoretic.   Nursing note and vitals reviewed.      ED Course             Results for orders placed or performed during the hospital encounter of 05/28/19 (from the past 24 hour(s))   Basic metabolic panel   Result Value Ref Range    Sodium 141 133 - 144 mmol/L    Potassium 3.3 (L) 3.4 - 5.3 mmol/L    Chloride 110 (H) 94 - 109 mmol/L    Carbon Dioxide 23 20 - 32 mmol/L    Anion Gap 8 3 - 14 mmol/L    Glucose 103 (H) 70 - 99 mg/dL    Urea Nitrogen 11 7 - 30 mg/dL    Creatinine 1.10 0.66 - 1.25 mg/dL    GFR Estimate 80 >60 mL/min/[1.73_m2]    GFR Estimate If Black >90 >60 mL/min/[1.73_m2]    Calcium 9.1 8.5 - 10.1 mg/dL   CBC with platelets   Result Value Ref Range    WBC 9.0 4.0 - 11.0 10e9/L    RBC Count 5.83 4.4 - 5.9 10e12/L    Hemoglobin 17.2 13.3 - 17.7 g/dL    Hematocrit 49.3 40.0 - 53.0 %    MCV 85 78 - 100 fl    MCH 29.5 26.5 - 33.0 pg    MCHC 34.9 31.5 - 36.5 g/dL    RDW 12.7 10.0 - 15.0 %    Platelet Count 272 150 - 450 10e9/L   Hepatic panel   Result Value Ref Range    Bilirubin Direct 0.1 0.0 - 0.2 mg/dL    Bilirubin Total 0.7 0.2 - 1.3 mg/dL    Albumin 4.4 3.4 - 5.0 g/dL    Protein Total 8.0 6.8 - 8.8 g/dL    Alkaline Phosphatase 63 40 - 150 U/L    ALT 79 (H) 0 - 70 U/L    AST 36 0 - 45 U/L   Lipase   Result Value Ref Range    Lipase 63 (L) 73 - 393 U/L       Medications   0.9% sodium chloride BOLUS (0 mLs Intravenous Stopped 5/28/19 1131)     Followed by   sodium chloride 0.9% infusion (1,000 mLs Intravenous New Bag 5/28/19 1132)   ondansetron (ZOFRAN) injection 4 mg (4 mg Intravenous Given 5/28/19 1040)   morphine (PF) injection 4 mg (4 mg Intravenous Given 5/28/19 1131)   metoclopramide (REGLAN) injection 10 mg (10 mg Intravenous Given 5/28/19 1239)   diphenhydrAMINE (BENADRYL) injection 25 mg (25 mg Intravenous Given 5/28/19 1040)       Assessments &  Plan (with Medical Decision Making)     I have reviewed the nursing notes.    I have reviewed the findings, diagnosis, plan and need for follow up with the patient.  No concern for pancreatitis with normal lipase.  LFTs normal.  No concern for hepatitis.  Collecting stool sample for testing due to severity of abdominal pain, vomiting and 6 days diarrhea.      Discussed patient with Dr. Ray who is on-call for surgery and indicated that he would see the patient in clinic today for further evaluation.  Patient discharged home and recommended patient go directly to surgical clinic for further evaluation and to discuss possible endoscopy though this is not necessarily warranted at this time with no rectal bleeding and no significant change in Hgb.         Medication List      Started    prochlorperazine 25 MG suppository  Commonly known as:  COMPAZINE  25 mg, Rectal, EVERY 12 HOURS PRN            Final diagnoses:   Acute gastritis without hemorrhage, unspecified gastritis type       5/28/2019   [facility]     Glen Adrian PA  05/29/19 4110

## 2019-05-28 NOTE — ED NOTES
"Pt now reports that he is having diarrhea when asked how much \"sometimes a little and a lot\" denies blood. Pt asking for \"muscle relaxer for my stomach\" Informed pt that I will talk with the provider.  "

## 2019-05-29 ENCOUNTER — ANESTHESIA EVENT (OUTPATIENT)
Dept: SURGERY | Facility: HOSPITAL | Age: 44
End: 2019-05-29
Payer: COMMERCIAL

## 2019-05-29 ASSESSMENT — LIFESTYLE VARIABLES: TOBACCO_USE: 1

## 2019-05-29 NOTE — PROGRESS NOTES
[facility] Surgery Consultation    CC:  Epigastric abdominal pain    HPI:  This 44 year old year old male is seen for evaluation of epigastric abdominal pain.  The history is obtained from the patient, and reviewing the medical record.  He is good medical historian. He says that over the past few days he has been having ongoing epigastric abdominal pain. He says every morning he will have nausea and emesis. The vomiting will go on for a few hours and by early afternoon he is good. He says that he hasn't been able to keep much food down, but he doesn't try to eat much during the afternoon. He was seen twice in the ER over the past few days. During those visits labs and imaging were normal. He has not had any hematemesis, melena, or hematochezia. He has not had any issues with diarrhea or constipation. He has not had any dysphagia or odynophagia. He says that he has been using ibuprofen 800mg at a time. He has a history of alcohol, and illicit drug use. He says that his brother was recently diagnosed with Crohn's disease. He was treated for gastritis in the ER and then sent to the surgical clinic for evaluation.    Past Medical History:   Diagnosis Date     Alcohol abuse, in remission 08/03/2012     Anabolic steroid abuse 08/03/2012     Anxiety state, unspecified 08/03/2012     Cannabis abuse, unspecified 08/03/2012     Depressive disorder, not elsewhere classified 08/03/2012     Obstructive sleep apnea (adult) (pediatric) 04/01/2011     Tobacco use disorder 04/01/2011     Umbilical hernia without mention of obstruction or gangrene 06/25/2008       Past Surgical History:   Procedure Laterality Date     HC SUPERFICIAL CAUTERY BILATERAL MIDDLE TURBINATES  11/2011    turbinate reduction, Bilateral      HERNIA REPAIR, UMBILICAL  02/2009     I&D PERIANAL ABSCESS,SUPERFICIAL      EUA, and unroofing of perianal abscess     SEPTOPLASTY  11/2011     TONSILLECTOMY  1999     UVULOPALATOPHARYNGOPLASTY  11/2011       Family  "History   Problem Relation Age of Onset     Heart Disease Father         atrial fibrillation, probably with a small MI      Colon Polyps Father      Other - See Comments Father         tobacco use      Breast Cancer Mother      Cancer Mother         lung, (cause of death)      Chronic Obstructive Pulmonary Disease Mother      Cancer - colorectal Paternal Grandfather      Substance Abuse Brother      Substance Abuse Brother        Social History     Tobacco Use     Smoking status: Former Smoker     Packs/day: 1.00     Years: 25.00     Pack years: 25.00     Types: Cigarettes     Last attempt to quit: 2012     Years since quittin.0     Smokeless tobacco: Former User     Tobacco comment: Passive Exposure: No; Longest Tobacco Free: 3 months    Substance Use Topics     Alcohol use: No     Comment: Former,      Drug use: Yes     Types: Marijuana       Prior to Admission medications    Medication Sig Start Date End Date Taking? Authorizing Provider   famotidine (PEPCID) 20 MG tablet Take 1 tablet (20 mg) by mouth 2 times daily 19  Yes Guera Delacruz PA-C   sucralfate (CARAFATE) 1 GM tablet Take 1 tablet (1 g) by mouth 4 times daily for 5 days 19 Yes Guera Delacruz PA-C   prochlorperazine (COMPAZINE) 25 MG suppository Place 1 suppository (25 mg) rectally every 12 hours as needed for nausea or vomiting  Patient not taking: Reported on 2019   Glen Adrian PA       Pt denied problems with bleeding or anesthesia  No mood altering drug use.       Allergies   Allergen Reactions     Codeine      Penicillins        REVIEW OF SYSTEMS:  Ten point review of systems negative except those mentioned in the HPI.     The patient denies sleep apnea, latex allergies or MRSA    OBJECTIVE:    /86   Pulse 90   Temp 99.4  F (37.4  C) (Tympanic)   Ht 1.702 m (5' 7\")   Wt 87.5 kg (193 lb)   SpO2 97%   BMI 30.23 kg/m      GENERAL: Generally appears well, in no distress with appropriate " affect.  HEENT:   Sclerae anicteric - No cervical, supra/infraclavicular lymphadenopathy  Respiratory:  Lungs clear to ausculation bilaterally with good air excursion  Cardiovascular:  Regular Rate and Rhythm with no murmurs gallops or rubs, normal   Abdomen: soft, mild epigastric tenderness, no rebound or guarding.  :  deferred  Extremities:  Extremities normal. No deformities, edema, or skin discoloration.  Skin:  no suspicious lesions or rashes  Neurological: grossly intact  Psych:  Alert, oriented, affect appropriate with normal decision making ability.      IMPRESSION:  45 yo male with nausea, emesis, and epigastric abdominal pain    PLAN:  At this time I recommend that we proceed with an upper endoscopy.  The indications, risks, benefits and technical aspects of esophagogastroduodenoscopy were reviewed with her questions asked and answered.  Antral biopsy for histologic examination will be performed and the place of H. pylori in gastritis was discussed.  The patient wishes scheduling at a mutually convenient date and time.  Preoperative preparation, npo after midnight preceding the date was discussed and a request made to hold aspirin containing agents one week prior to ameliorate antiplatelet effect.  Questions asked and answered - will proceed based on scheduling availability.        Thank you for allowing me to participate in the care of your patient.       Genaro Ray MD    5/29/2019  3:01 PM    cc:  Benjie Ramos

## 2019-05-29 NOTE — ANESTHESIA PREPROCEDURE EVALUATION
Anesthesia Pre-Procedure Evaluation    Patient: Samuel Bravo   MRN: [id number] : 1975          Preoperative Diagnosis: NAUSEA WITH VOMITING    Procedure(s):  UPPER ENDOSCOPY    Past Medical History:   Diagnosis Date     Alcohol abuse, in remission 2012     Anabolic steroid abuse 2012     Anxiety state, unspecified 2012     Cannabis abuse, unspecified 2012     Depressive disorder, not elsewhere classified 2012     Obstructive sleep apnea (adult) (pediatric) 2011     Tobacco use disorder 2011     Umbilical hernia without mention of obstruction or gangrene 2008     Past Surgical History:   Procedure Laterality Date     HC SUPERFICIAL CAUTERY BILATERAL MIDDLE TURBINATES  2011    turbinate reduction, Bilateral      HERNIA REPAIR, UMBILICAL  2009     I&D PERIANAL ABSCESS,SUPERFICIAL      EUA, and unroofing of perianal abscess     SEPTOPLASTY  2011     TONSILLECTOMY  1999     UVULOPALATOPHARYNGOPLASTY  2011       Anesthesia Evaluation     . Pt has had prior anesthetic. Type: General    No history of anesthetic complications          ROS/MED HX    ENT/Pulmonary: Comment: Have had nasal surgery and uvula cut, cannot wear cpap    (+)sleep apnea, tobacco use, Past use doesn't use CPAP , . .    Neurologic: Comment: Restless leg      Cardiovascular:  - neg cardiovascular ROS       METS/Exercise Tolerance:  >4 METS   Hematologic:  - neg hematologic  ROS       Musculoskeletal:  - neg musculoskeletal ROS       GI/Hepatic:     (+) GERD Symptomatic,       Renal/Genitourinary: Comment: Umbilical hernia - ROS Renal section negative       Endo:  - neg endo ROS       Psychiatric:     (+) psychiatric history depression and anxiety      Infectious Disease:  - neg infectious disease ROS       Malignancy:      - no malignancy   Other: Comment: ETOH and cannabis abuse  Anabolic steroid abuse   - neg other ROS             No H&P as of 19    ECG on 19 showed  "sinus rhythm with possible left atrial enlargment         Physical Exam  Normal systems: cardiovascular, pulmonary and dental    Airway   Mallampati: II  TM distance: >3 FB  Neck ROM: full    Dental     Cardiovascular   Rhythm and rate: regular and normal      Pulmonary    breath sounds clear to auscultation            Lab Results   Component Value Date    WBC 9.0 05/28/2019    HGB 17.2 05/28/2019    HCT 49.3 05/28/2019     05/28/2019    CRP <2.9 05/28/2019    SED 4 05/28/2019     05/28/2019    POTASSIUM 3.3 (L) 05/28/2019    CHLORIDE 110 (H) 05/28/2019    CO2 23 05/28/2019    BUN 11 05/28/2019    CR 1.10 05/28/2019     (H) 05/28/2019    LAZARO 9.1 05/28/2019    ALBUMIN 4.4 05/28/2019    PROTTOTAL 8.0 05/28/2019    ALT 79 (H) 05/28/2019    AST 36 05/28/2019    ALKPHOS 63 05/28/2019    BILITOTAL 0.7 05/28/2019    LIPASE 63 (L) 05/28/2019    INR 1.0 10/01/2018       Preop Vitals  BP Readings from Last 3 Encounters:   05/28/19 120/86   05/28/19 (!) 162/115   05/24/19 (!) 172/118    Pulse Readings from Last 3 Encounters:   05/28/19 90   05/24/19 80   05/23/19 85      Resp Readings from Last 3 Encounters:   05/28/19 18   05/24/19 16   05/23/19 18    SpO2 Readings from Last 3 Encounters:   05/28/19 97%   05/28/19 96%   05/24/19 97%      Temp Readings from Last 1 Encounters:   05/28/19 99.4  F (37.4  C) (Tympanic)    Ht Readings from Last 1 Encounters:   05/28/19 1.702 m (5' 7\")      Wt Readings from Last 1 Encounters:   05/28/19 87.5 kg (193 lb)    Estimated body mass index is 30.23 kg/m  as calculated from the following:    Height as of 5/28/19: 1.702 m (5' 7\").    Weight as of 5/28/19: 87.5 kg (193 lb).       Anesthesia Plan      History & Physical Review  History and physical reviewed and following examination; no interval change.    ASA Status:  3 .    NPO Status:  > 8 hours    Plan for MAC with Intravenous and Propofol induction. Reason for MAC:  Extreme anxiety (QS), Deep or markedly invasive " procedure (G8) and Procedure to face, neck, head or breast         Postoperative Care      Consents  Anesthetic plan, risks, benefits and alternatives discussed with:  Patient..                 Alissa Moreland APRN CRNA

## 2019-05-30 ENCOUNTER — ANESTHESIA (OUTPATIENT)
Dept: SURGERY | Facility: HOSPITAL | Age: 44
End: 2019-05-30
Payer: COMMERCIAL

## 2019-05-30 ENCOUNTER — HOSPITAL ENCOUNTER (OUTPATIENT)
Facility: HOSPITAL | Age: 44
Discharge: HOME | End: 2019-05-30
Attending: SURGERY | Admitting: SURGERY
Payer: COMMERCIAL

## 2019-05-30 VITALS
TEMPERATURE: 97.9 F | DIASTOLIC BLOOD PRESSURE: 103 MMHG | BODY MASS INDEX: 30.51 KG/M2 | SYSTOLIC BLOOD PRESSURE: 151 MMHG | OXYGEN SATURATION: 98 % | WEIGHT: 194.4 LBS | HEIGHT: 67 IN

## 2019-05-30 PROCEDURE — 37000008 ZZH ANESTHESIA TECHNICAL FEE, 1ST 30 MIN: Performed by: SURGERY

## 2019-05-30 PROCEDURE — 43239 EGD BIOPSY SINGLE/MULTIPLE: CPT | Performed by: SURGERY

## 2019-05-30 PROCEDURE — 71000027 ZZH RECOVERY PHASE 2 EACH 15 MINS: Performed by: SURGERY

## 2019-05-30 PROCEDURE — 36000050 ZZH SURGERY LEVEL 2 1ST 30 MIN: Performed by: SURGERY

## 2019-05-30 PROCEDURE — 01999 UNLISTED ANES PROCEDURE: CPT | Performed by: NURSE ANESTHETIST, CERTIFIED REGISTERED

## 2019-05-30 PROCEDURE — 88305 TISSUE EXAM BY PATHOLOGIST: CPT | Mod: TC | Performed by: SURGERY

## 2019-05-30 PROCEDURE — 25000125 ZZHC RX 250: Performed by: NURSE ANESTHETIST, CERTIFIED REGISTERED

## 2019-05-30 PROCEDURE — 40000306 ZZH STATISTIC PRE PROC ASSESS II: Performed by: SURGERY

## 2019-05-30 PROCEDURE — 25000128 H RX IP 250 OP 636: Performed by: NURSE ANESTHETIST, CERTIFIED REGISTERED

## 2019-05-30 RX ORDER — LIDOCAINE HYDROCHLORIDE 20 MG/ML
INJECTION, SOLUTION INFILTRATION; PERINEURAL PRN
Status: DISCONTINUED | OUTPATIENT
Start: 2019-05-30 | End: 2019-05-30

## 2019-05-30 RX ORDER — NALOXONE HYDROCHLORIDE 0.4 MG/ML
.1-.4 INJECTION, SOLUTION INTRAMUSCULAR; INTRAVENOUS; SUBCUTANEOUS
Status: DISCONTINUED | OUTPATIENT
Start: 2019-05-30 | End: 2019-05-30 | Stop reason: HOSPADM

## 2019-05-30 RX ORDER — FLUMAZENIL 0.1 MG/ML
0.2 INJECTION, SOLUTION INTRAVENOUS
Status: DISCONTINUED | OUTPATIENT
Start: 2019-05-30 | End: 2019-05-30 | Stop reason: HOSPADM

## 2019-05-30 RX ORDER — SODIUM CHLORIDE, SODIUM LACTATE, POTASSIUM CHLORIDE, CALCIUM CHLORIDE 600; 310; 30; 20 MG/100ML; MG/100ML; MG/100ML; MG/100ML
INJECTION, SOLUTION INTRAVENOUS CONTINUOUS
Status: DISCONTINUED | OUTPATIENT
Start: 2019-05-30 | End: 2019-05-30 | Stop reason: HOSPADM

## 2019-05-30 RX ORDER — LANSOPRAZOLE 30 MG/1
30 CAPSULE, DELAYED RELEASE ORAL 2 TIMES DAILY
COMMUNITY
End: 2019-09-25

## 2019-05-30 RX ORDER — LIDOCAINE 40 MG/G
CREAM TOPICAL
Status: DISCONTINUED | OUTPATIENT
Start: 2019-05-30 | End: 2019-05-30 | Stop reason: HOSPADM

## 2019-05-30 RX ORDER — PROPOFOL 10 MG/ML
INJECTION, EMULSION INTRAVENOUS PRN
Status: DISCONTINUED | OUTPATIENT
Start: 2019-05-30 | End: 2019-05-30

## 2019-05-30 RX ADMIN — PROPOFOL 50 MG: 10 INJECTION, EMULSION INTRAVENOUS at 12:00

## 2019-05-30 RX ADMIN — MIDAZOLAM 2 MG: 1 INJECTION INTRAMUSCULAR; INTRAVENOUS at 11:49

## 2019-05-30 RX ADMIN — PROPOFOL 50 MG: 10 INJECTION, EMULSION INTRAVENOUS at 11:55

## 2019-05-30 RX ADMIN — PROPOFOL 50 MG: 10 INJECTION, EMULSION INTRAVENOUS at 11:49

## 2019-05-30 RX ADMIN — PROPOFOL 50 MG: 10 INJECTION, EMULSION INTRAVENOUS at 11:53

## 2019-05-30 RX ADMIN — PROPOFOL 20 MG: 10 INJECTION, EMULSION INTRAVENOUS at 12:03

## 2019-05-30 RX ADMIN — LIDOCAINE HYDROCHLORIDE 40 MG: 20 INJECTION, SOLUTION INFILTRATION; PERINEURAL at 11:49

## 2019-05-30 ASSESSMENT — MIFFLIN-ST. JEOR: SCORE: 1720.42

## 2019-05-30 NOTE — ANESTHESIA CARE TRANSFER NOTE
Patient: Samuel Bravo    Procedure(s):  UPPER ENDOSCOPY with BIOPSY    Diagnosis: NAUSEA WITH VOMITING  Diagnosis Additional Information: No value filed.    Anesthesia Type:   MAC     Note:  Airway :Nasal Cannula  Patient transferred to:Phase II  Handoff Report: Identifed the Patient, Identified the Reponsible Provider, Reviewed the pertinent medical history, Discussed the surgical course, Reviewed Intra-OP anesthesia mangement and issues during anesthesia, Set expectations for post-procedure period and Allowed opportunity for questions and acknowledgement of understanding      Vitals: (Last set prior to Anesthesia Care Transfer)    CRNA VITALS  5/30/2019 1134 - 5/30/2019 1207      5/30/2019             Pulse:  92    Ht Rate:  91    SpO2:  98 %    Resp Rate (set):  8                Electronically Signed By: HARESH Hobson CRNA  May 30, 2019  12:07 PM

## 2019-05-30 NOTE — DISCHARGE INSTRUCTIONS
UPPER ENDOSCOPY    AFTER THE PROCEDURE    You will return to Same Day Surgery to rest for about an hour before you go home.    The doctor will talk with you and your family.    A family member/friend may visit with you.    You may burp up any air remaining in your stomach.    You may feel dizzy or light-headed from the medicine.    Your nurse will go over the discharge instructions with you and your caregiver and answer any of your questions.      You will be contacted the next day to check on how you are doing.    If biopsies were taken, you will be contacted with the results usually within 3 days.  BACK AT HOME    Rest for an hour or two after you get home.    When your throat is no longer numb and you have a gag reflex, take a few sips of cool water.  If you can swallow comfortably, you may start eating again.    You may have a mild sore throat for the rest of the day.    You will be contacted with results by the surgeons office within a week. If not contacted in one week, call the surgeons office.  WHAT TO WATCH FOR:  Problems rarely occur after the exam, but it is important to be aware of the early signs of a complication.  Call your doctor immediately if you have:    Difficulty swallowing or breathing    Unusual pain in your stomach or chest    Vomiting blood or dark material that looks like coffee grounds    Black or tarry stools    Temperature over 101.5 degrees    MORE QUESTIONS?  Please ask your doctor or nurse before the exam begins  or call your doctor at the clinic.    IF YOU MUST CANCEL YOUR PROCEDURE THE EVENING/NIGHT BEFORE, PLEASE CALL HOSPITAL ADMITTING -[phone] OR TOLL FREE [phone], EXT. [phone].    Phone Numbers:  [facility] - [phone] [phone]  [facility] - [phone]  Surgery Patient Education - [phone] or toll free [phone]    Post-Anesthesia Patient Instructions    IMMEDIATELY FOLLOWING SURGERY:  Do not drive or operate machinery for the first twenty  four hours after surgery.  Do not make any important decisions for twenty four hours after surgery or while taking narcotic pain medications or sedatives.  If you develop intractable nausea and vomiting or a severe headache please notify your doctor immediately.    FOLLOW-UP:  Please make an appointment with your surgeon as instructed. You do not need to follow up with anesthesia unless specifically instructed to do so.    WOUND CARE INSTRUCTIONS (if applicable):  Keep a dry clean dressing on the anesthesia/puncture wound site if there is drainage.  Once the wound has quit draining you may leave it open to air.  Generally you should leave the bandage intact for twenty four hours unless there is drainage.  If the epidural site drains for more than 36-48 hours please call the anesthesia department.    QUESTIONS?:  Please feel free to call your physician or the hospital  if you have any questions, and they will be happy to assist you.

## 2019-05-30 NOTE — OR NURSING
Patient and responsible adult given discharge instructions with no questions regarding instructions. Beulah score 20. Pain level 0/10.  Discharged from unit via walking . Patient discharged to home .

## 2019-05-30 NOTE — OP NOTE
Samuel Bravo MRN# [id number]   YOB: 1975 Age: 44 year old      Date of Admission:  5/30/2019    Primary care provider: Benjie Ramos    PREOPERATIVE DIAGNOSIS:  Nausea and vomiting      POSTOPERATIVE DIAGNOSES:    Type I hiatal hernia      PROCEDURE:  Esophagogastroduodenoscopy with cold forceps biopsies.       HISTORY OF PRESENT ILLNESS:  This 44 year old male developed nausea, emesis, and epigastric abdominal pain requiring upper endoscopy.     OPERATIVE FINDINGS:  The gastroesophageal junction was noted 40 cm from the incisors, the diaphragmatic pinch was at 43 cm. There was a 3 cm sliding, Type I hiatal hernia..  The Z line was irregular with changes suggesting reflux.  There was no evidence of ulceration or stricture.      Specimen(s) submitted to pathology: Duodenal, antral and GE junction biopsies    PROCEDURE:  With the patient in the supine position on the transport cart, IV sedation was administered by the nurse anesthetist.  His correct identity and planned procedure were confirmed during a requisite timeout pause.  A bite block was placed and the fiberoptic endoscope was introduced and negotiated through the cricopharyngeus without difficulty.  The length of the esophagus was examined without findings.  The gastroesophageal junction was noted 40 cm from the incisors; the Z line was irregular without ulceration, bleeding source or stricture.  There was no  evidence of Yanes's change.  The diaphragmatic pinch was at 43 cm indicating a sliding Type I hiatal hernia. The stomach was entered and the pylorus was traversed easily.  The gastric mucosa was normal; there was no evidence of gastric polyp, ulcer or bleeding source.  The duodenum was similarly without finding. Multiple biopsies of the duodenum were taken. The endoscope was returned to the body of the stomach and retroflex confirmed the a sliding hiatal hernia.      Random cold forceps biopsies were obtained of the antrum for  H. pylori.  Hemostasis was judged adequate on visualization.   The endoscope was returned to the GE junction.  Circumferential biopsies were obtained; hemostasis was satisfactory.  A second circumferential examination of the esophagus was performed on slow withdrawal of the endoscope without additional finding.  The procedure was satisfactorially tolerated; there was no appreciable blood loss and the patient was returned to Day Surgery in good condition.      Genaro Ray MD  5/30/2019  12:10 PM

## 2019-05-30 NOTE — OR NURSING
Blood pressure running high anesthesia aware    Back to base line  frank encouraged to  See primary doctor  To follow up for blood pressur e

## 2019-05-30 NOTE — ANESTHESIA POSTPROCEDURE EVALUATION
Patient: Samuel Bravo    Procedure(s):  UPPER ENDOSCOPY with BIOPSY    Diagnosis:NAUSEA WITH VOMITING  Diagnosis Additional Information: No value filed.    Anesthesia Type:  MAC    Note:  Anesthesia Post Evaluation    Patient location during evaluation: Phase 2  Patient participation: Able to fully participate in evaluation  Level of consciousness: awake and alert  Pain management: adequate  Airway patency: patent  Cardiovascular status: acceptable  Respiratory status: acceptable  Hydration status: acceptable  PONV: none             Last vitals:  Vitals:    05/30/19 1255 05/30/19 1300 05/30/19 1305   BP: (!) 159/104 (!) 147/106 (!) 151/103   Temp:      SpO2: 97% 96% 98%         Electronically Signed By: HARESH Rosario CRNA  May 30, 2019  1:21 PM

## 2019-05-31 LAB — COPATH REPORT: NORMAL

## 2019-06-06 ENCOUNTER — TRANSFERRED RECORDS (OUTPATIENT)
Dept: HEALTH INFORMATION MANAGEMENT | Facility: CLINIC | Age: 44
End: 2019-06-06

## 2019-06-10 NOTE — PATIENT INSTRUCTIONS
Thank you for allowing Dr. Ray and our surgical team to participate in your care. Please call our health unit coordinator at [phone] with scheduling questions or the nurse at [phone] with any other questions or concerns.

## 2019-06-13 ENCOUNTER — OFFICE VISIT (OUTPATIENT)
Dept: SURGERY | Facility: OTHER | Age: 44
End: 2019-06-13
Attending: SURGERY
Payer: COMMERCIAL

## 2019-06-13 VITALS
WEIGHT: 191 LBS | HEART RATE: 102 BPM | BODY MASS INDEX: 29.98 KG/M2 | SYSTOLIC BLOOD PRESSURE: 120 MMHG | TEMPERATURE: 97.7 F | OXYGEN SATURATION: 95 % | HEIGHT: 67 IN | DIASTOLIC BLOOD PRESSURE: 78 MMHG

## 2019-06-13 DIAGNOSIS — R14.0 ABDOMINAL BLOATING: Primary | ICD-10-CM

## 2019-06-13 PROCEDURE — G0463 HOSPITAL OUTPT CLINIC VISIT: HCPCS

## 2019-06-13 PROCEDURE — 99212 OFFICE O/P EST SF 10 MIN: CPT | Performed by: SURGERY

## 2019-06-13 ASSESSMENT — MIFFLIN-ST. JEOR: SCORE: 1705

## 2019-06-13 ASSESSMENT — PAIN SCALES - GENERAL: PAINLEVEL: NO PAIN (0)

## 2019-06-13 NOTE — NURSING NOTE
"Chief Complaint   Patient presents with     Surgical Followup     EGD 5/30/19       Initial /78 (BP Location: Right arm, Patient Position: Chair, Cuff Size: Adult Large)   Pulse 102   Temp 97.7  F (36.5  C) (Tympanic)   Ht 1.702 m (5' 7\")   Wt 86.6 kg (191 lb)   SpO2 95%   BMI 29.91 kg/m   Estimated body mass index is 29.91 kg/m  as calculated from the following:    Height as of this encounter: 1.702 m (5' 7\").    Weight as of this encounter: 86.6 kg (191 lb).  Medication Reconciliation: complete        "

## 2019-06-13 NOTE — PROGRESS NOTES
"SUBJECTIVE:  Mr. Bravo presents after his recent EGD. He says that he has been feeling better. He has not been having cyclical vomiting. He says that he still has problems with abdominal bloating. 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 He has been able to eat and tolerate a diet, but will have abdominal bloating.    OBJECTIVE:  /78 (BP Location: Right arm, Patient Position: Chair, Cuff Size: Adult Large)   Pulse 102   Temp 97.7  F (36.5  C) (Tympanic)   Ht 1.702 m (5' 7\")   Wt 86.6 kg (191 lb)   SpO2 95%   BMI 29.91 kg/m      Gen: AAA, NAD    ASSESSMENT/PLAN:  45 yo male s/p EGD   Intestinal metaplasia to antrum      At this time I recommend that he be evaluated by gastroenterology for his ongoing issues with abdominal bloating and his history of anal fistula, with a family history of Crohn's disease. All questions and concerns were addressed with adequate time spent answering all concerns.    Genaro Ray MD      "

## 2019-07-01 ENCOUNTER — TRANSFERRED RECORDS (OUTPATIENT)
Dept: HEALTH INFORMATION MANAGEMENT | Facility: CLINIC | Age: 44
End: 2019-07-01

## 2019-07-23 ENCOUNTER — HOSPITAL ENCOUNTER (OUTPATIENT)
Dept: INTERVENTIONAL RADIOLOGY/VASCULAR | Facility: HOSPITAL | Age: 44
Discharge: HOME | End: 2019-07-23
Attending: PHYSICAL MEDICINE & REHABILITATION | Admitting: PHYSICAL MEDICINE & REHABILITATION
Payer: COMMERCIAL

## 2019-07-23 DIAGNOSIS — M47.816 SPONDYLOSIS OF LUMBAR REGION WITHOUT MYELOPATHY OR RADICULOPATHY: ICD-10-CM

## 2019-07-23 PROCEDURE — 25000128 H RX IP 250 OP 636: Performed by: RADIOLOGY

## 2019-07-23 PROCEDURE — 62323 NJX INTERLAMINAR LMBR/SAC: CPT | Mod: TC

## 2019-07-23 PROCEDURE — 25500064 ZZH RX 255 OP 636: Performed by: RADIOLOGY

## 2019-07-23 RX ORDER — METHYLPREDNISOLONE ACETATE 80 MG/ML
80 INJECTION, SUSPENSION INTRA-ARTICULAR; INTRALESIONAL; INTRAMUSCULAR; SOFT TISSUE ONCE
Status: COMPLETED | OUTPATIENT
Start: 2019-07-23 | End: 2019-07-23

## 2019-07-23 RX ORDER — LIDOCAINE HYDROCHLORIDE 10 MG/ML
INJECTION, SOLUTION EPIDURAL; INFILTRATION; INTRACAUDAL; PERINEURAL
Status: DISCONTINUED
Start: 2019-07-23 | End: 2019-07-23 | Stop reason: WASHOUT

## 2019-07-23 RX ORDER — METHYLPREDNISOLONE ACETATE 80 MG/ML
INJECTION, SUSPENSION INTRA-ARTICULAR; INTRALESIONAL; INTRAMUSCULAR; SOFT TISSUE
Status: DISCONTINUED
Start: 2019-07-23 | End: 2019-07-24 | Stop reason: HOSPADM

## 2019-07-23 RX ADMIN — IOHEXOL 3 ML: 240 INJECTION, SOLUTION INTRATHECAL; INTRAVASCULAR; INTRAVENOUS; ORAL at 15:18

## 2019-07-23 RX ADMIN — METHYLPREDNISOLONE ACETATE 80 MG: 80 INJECTION, SUSPENSION INTRA-ARTICULAR; INTRALESIONAL; INTRAMUSCULAR; SOFT TISSUE at 15:19

## 2019-07-23 NOTE — DISCHARGE INSTRUCTIONS
Home number on file [phone] (home)  Is it ok to leave a message at this number(s)? Yes    Dr Hurd completed your procedure on 7/23/2019.    Current Pain Level (0-10 Scale): 4/10  Post Pain Level (0-10):  0/10    Radiology Discharge instructions for Steroid Injection    Activity Level:     Do not do any heavy activity or exercise for 24 hours.   Do not drive for 4 hours after your injection.  Diet:   Return to your normal diet.  Medications:   If you have stopped taking your Aspirin, Coumadin/Warfarin, Ibuprofen, or any   other blood thinner for this procedure you may resume in the morning unless   your primary care provider has given you other instructions.    Diabetics may see an increase in blood sugar after steroid injections. If you are concerned about your blood sugar, please contact your family doctor.    Site Care:  Remove the bandage and bathe or shower the morning after the procedure.      Please allow two weeks to experience improvement in your pain.  If you have any further issues, please contact your provider.    Call your Primary Care Provider if you have the following (if your primary care provider is not available please seek emergency care):   Nausea with vomiting   Severe headache   Drowsiness or confusion   Redness or drainage at the injection or puncture site   Temperature over 101 degrees F   Other concerns   Worsening back pain   Stiff neck

## 2019-07-23 NOTE — IP AVS SNAPSHOT
[facility]  [address] [address]  Phone:  [phone]  Fax:  [phone]                                    After Visit Summary   7/23/2019    Samuel Bravo    MRN: [id number]           After Visit Summary Signature Page    I have received my discharge instructions, and my questions have been answered. I have discussed any challenges I see with this plan with the nurse or doctor.    ..........................................................................................................................................  Patient/Patient Representative Signature      ..........................................................................................................................................  Patient Representative Print Name and Relationship to Patient    ..................................................               ................................................  Date                                   Time    ..........................................................................................................................................  Reviewed by Signature/Title    ...................................................              ..............................................  Date                                               Time          22EPIC Rev 08/18

## 2019-08-06 ENCOUNTER — HOSPITAL ENCOUNTER (OUTPATIENT)
Dept: INTERVENTIONAL RADIOLOGY/VASCULAR | Facility: HOSPITAL | Age: 44
Discharge: HOME | End: 2019-08-06
Attending: PHYSICAL MEDICINE & REHABILITATION | Admitting: PHYSICAL MEDICINE & REHABILITATION
Payer: COMMERCIAL

## 2019-08-06 ENCOUNTER — TELEPHONE (OUTPATIENT)
Dept: INTERVENTIONAL RADIOLOGY/VASCULAR | Facility: HOSPITAL | Age: 44
End: 2019-08-06

## 2019-08-06 DIAGNOSIS — M46.1 SACROILIITIS, NOT ELSEWHERE CLASSIFIED (H): ICD-10-CM

## 2019-08-06 PROCEDURE — 25500064 ZZH RX 255 OP 636: Performed by: RADIOLOGY

## 2019-08-06 PROCEDURE — 25000125 ZZHC RX 250: Performed by: RADIOLOGY

## 2019-08-06 PROCEDURE — 27096 INJECT SACROILIAC JOINT: CPT | Mod: TC,RT

## 2019-08-06 PROCEDURE — 25000128 H RX IP 250 OP 636: Performed by: RADIOLOGY

## 2019-08-06 RX ORDER — METHYLPREDNISOLONE ACETATE 80 MG/ML
80 INJECTION, SUSPENSION INTRA-ARTICULAR; INTRALESIONAL; INTRAMUSCULAR; SOFT TISSUE ONCE
Status: COMPLETED | OUTPATIENT
Start: 2019-08-06 | End: 2019-08-06

## 2019-08-06 RX ORDER — LIDOCAINE HYDROCHLORIDE 10 MG/ML
INJECTION, SOLUTION EPIDURAL; INFILTRATION; INTRACAUDAL; PERINEURAL
Status: DISCONTINUED
Start: 2019-08-06 | End: 2019-08-07 | Stop reason: HOSPADM

## 2019-08-06 RX ORDER — METHYLPREDNISOLONE ACETATE 80 MG/ML
INJECTION, SUSPENSION INTRA-ARTICULAR; INTRALESIONAL; INTRAMUSCULAR; SOFT TISSUE
Status: DISCONTINUED
Start: 2019-08-06 | End: 2019-08-07 | Stop reason: HOSPADM

## 2019-08-06 RX ORDER — IOPAMIDOL 612 MG/ML
50 INJECTION, SOLUTION INTRAVASCULAR ONCE
Status: COMPLETED | OUTPATIENT
Start: 2019-08-06 | End: 2019-08-06

## 2019-08-06 RX ADMIN — LIDOCAINE HYDROCHLORIDE 4 ML: 10 INJECTION, SOLUTION EPIDURAL; INFILTRATION; INTRACAUDAL; PERINEURAL at 13:16

## 2019-08-06 RX ADMIN — METHYLPREDNISOLONE ACETATE 80 MG: 80 INJECTION, SUSPENSION INTRA-ARTICULAR; INTRALESIONAL; INTRAMUSCULAR; SOFT TISSUE at 13:15

## 2019-08-06 RX ADMIN — IOPAMIDOL 1 ML: 612 INJECTION, SOLUTION INTRAVENOUS at 13:15

## 2019-08-06 NOTE — TELEPHONE ENCOUNTER
INJECTION POST CALL    Procedure: Epidural Lumbar TL L3-4  Radiologist(s): Dr. Fernando Hurd  Date of Procedure:  7/23/19    Pre-procedure pain score was: 4 (See pre-procedure score)  Post-procedure pain score as of today is: 0  Percentage of pain improvement today?  100%  Where is the pain located? No pain  Is this new pain? No  Would you like to be scheduled for an additional injection?  No      The patient had no questions or concerns.    Patient will contact the provider if there are any issues.      Mireya Leblanc

## 2019-08-06 NOTE — IP AVS SNAPSHOT
[facility]  [address] [address]  Phone:  [phone]  Fax:  [phone]                                    After Visit Summary   8/6/2019    Samuel Bravo    MRN: [id number]           After Visit Summary Signature Page    I have received my discharge instructions, and my questions have been answered. I have discussed any challenges I see with this plan with the nurse or doctor.    ..........................................................................................................................................  Patient/Patient Representative Signature      ..........................................................................................................................................  Patient Representative Print Name and Relationship to Patient    ..................................................               ................................................  Date                                   Time    ..........................................................................................................................................  Reviewed by Signature/Title    ...................................................              ..............................................  Date                                               Time          22EPIC Rev 08/18

## 2019-08-06 NOTE — DISCHARGE INSTRUCTIONS
Home number on file [phone] (home)  Is it ok to leave a message at this number(s)? Yes    Dr. Lopez completed your procedure on 8/6/2019.    Current Pain Level (0-10 Scale): 2/10  Post Pain Level (0-10):  0/10    Radiology Discharge instructions for Steroid Injection    Activity Level:     Do not do any heavy activity or exercise for 24 hours.   Do not drive for 4 hours after your injection.  Diet:   Return to your normal diet.  Medications:   If you have stopped taking your Aspirin, Coumadin/Warfarin, Ibuprofen, or any   other blood thinner for this procedure you may resume in the morning unless   your primary care provider has given you other instructions.    Diabetics may see an increase in blood sugar after steroid injections. If you are concerned about your blood sugar, please contact your family doctor.    Site Care:  Remove the bandage and bathe or shower the morning after the procedure.      Please allow two weeks to experience improvement in your pain.  If you have any further issues, please contact your provider.    Call your Primary Care Provider if you have the following (if your primary care provider is not available please seek emergency care):   Nausea with vomiting   Severe headache   Drowsiness or confusion   Redness or drainage at the injection or puncture site   Temperature over 101 degrees F   Other concerns   Worsening back pain   Stiff neck

## 2019-08-20 ENCOUNTER — TELEPHONE (OUTPATIENT)
Dept: INTERVENTIONAL RADIOLOGY/VASCULAR | Facility: HOSPITAL | Age: 44
End: 2019-08-20

## 2019-08-20 NOTE — TELEPHONE ENCOUNTER
INJECTION POST CALL    Procedure: Right SI Joint  Radiologist(s): Dr. Ld Lopez  Date of Procedure:  8/6/19    Pre-procedure pain score was: 2   Post-procedure pain score as of today is: 0  Percentage of pain improvement today?  100%  Where is the pain located? Left side  Is this new pain? Yes, patient rated as a 9.  Would you like to be scheduled for an additional injection?  Yes    Patient states that he had relief from injection on right side, no pain today. He was moving some furniture around on Saturday 8/17/19, and tweaked his back and spends time laying in bed for the last 4 days on his back and side. No pain in the neck. Patient is interested in having another injection before his blocks, will be contacting his PCP for an order.    I responded to the patient's questions/concerns.    Patient will contact the provider if there are any issues.      Olya Snowden

## 2019-08-30 ENCOUNTER — HOSPITAL ENCOUNTER (OUTPATIENT)
Dept: INTERVENTIONAL RADIOLOGY/VASCULAR | Facility: HOSPITAL | Age: 44
Discharge: HOME | End: 2019-08-30
Attending: FAMILY MEDICINE | Admitting: FAMILY MEDICINE
Payer: COMMERCIAL

## 2019-08-30 DIAGNOSIS — M54.50 LOW BACK PAIN: ICD-10-CM

## 2019-08-30 DIAGNOSIS — M47.816 SPONDYLOSIS WITHOUT MYELOPATHY OR RADICULOPATHY, LUMBAR REGION: ICD-10-CM

## 2019-08-30 PROCEDURE — 25500064 ZZH RX 255 OP 636: Performed by: RADIOLOGY

## 2019-08-30 PROCEDURE — 25000128 H RX IP 250 OP 636: Performed by: RADIOLOGY

## 2019-08-30 PROCEDURE — 62323 NJX INTERLAMINAR LMBR/SAC: CPT | Mod: TC

## 2019-08-30 RX ORDER — METHYLPREDNISOLONE ACETATE 80 MG/ML
80 INJECTION, SUSPENSION INTRA-ARTICULAR; INTRALESIONAL; INTRAMUSCULAR; SOFT TISSUE ONCE
Status: COMPLETED | OUTPATIENT
Start: 2019-08-30 | End: 2019-08-30

## 2019-08-30 RX ORDER — METHYLPREDNISOLONE ACETATE 80 MG/ML
INJECTION, SUSPENSION INTRA-ARTICULAR; INTRALESIONAL; INTRAMUSCULAR; SOFT TISSUE
Status: DISPENSED
Start: 2019-08-30 | End: 2019-08-30

## 2019-08-30 RX ADMIN — IOHEXOL 3 ML: 300 INJECTION, SOLUTION INTRAVENOUS at 09:51

## 2019-08-30 RX ADMIN — METHYLPREDNISOLONE ACETATE 80 MG: 80 INJECTION, SUSPENSION INTRA-ARTICULAR; INTRALESIONAL; INTRAMUSCULAR; SOFT TISSUE at 09:51

## 2019-08-30 NOTE — DISCHARGE INSTRUCTIONS
Home number on file [phone] (home)  Is it ok to leave a message at this number(s)? Yes    Dr Hurd completed your procedure on 8/30/2019.    Current Pain Level (0-10 Scale): 6/10  Post Pain Level (0-10):  0/10    Radiology Discharge instructions for Steroid Injection    Activity Level:     Do not do any heavy activity or exercise for 24 hours.   Do not drive for 4 hours after your injection.  Diet:   Return to your normal diet.  Medications:   If you have stopped taking your Aspirin, Coumadin/Warfarin, Ibuprofen, or any   other blood thinner for this procedure you may resume in the morning unless   your primary care provider has given you other instructions.    Diabetics may see an increase in blood sugar after steroid injections. If you are concerned about your blood sugar, please contact your family doctor.    Site Care:  Remove the bandage and bathe or shower the morning after the procedure.      Please allow two weeks to experience improvement in your pain.  If you have any further issues, please contact your provider.    Call your Primary Care Provider if you have the following (if your primary care provider is not available please seek emergency care):   Nausea with vomiting   Severe headache   Drowsiness or confusion   Redness or drainage at the injection or puncture site   Temperature over 101 degrees F   Other concerns   Worsening back pain   Stiff neck         No

## 2019-08-30 NOTE — IP AVS SNAPSHOT
[facility]  [address] [address]  Phone:  [phone]  Fax:  [phone]                                    After Visit Summary   8/30/2019    Samuel Bravo    MRN: [id number]           After Visit Summary Signature Page    I have received my discharge instructions, and my questions have been answered. I have discussed any challenges I see with this plan with the nurse or doctor.    ..........................................................................................................................................  Patient/Patient Representative Signature      ..........................................................................................................................................  Patient Representative Print Name and Relationship to Patient    ..................................................               ................................................  Date                                   Time    ..........................................................................................................................................  Reviewed by Signature/Title    ...................................................              ..............................................  Date                                               Time          22EPIC Rev 08/18

## 2019-09-09 ENCOUNTER — HOSPITAL ENCOUNTER (OUTPATIENT)
Dept: INTERVENTIONAL RADIOLOGY/VASCULAR | Facility: HOSPITAL | Age: 44
Discharge: HOME | End: 2019-09-09
Attending: PHYSICAL MEDICINE & REHABILITATION | Admitting: PHYSICAL MEDICINE & REHABILITATION
Payer: COMMERCIAL

## 2019-09-09 DIAGNOSIS — M47.812 SPONDYLOSIS OF CERVICAL REGION WITHOUT MYELOPATHY OR RADICULOPATHY: ICD-10-CM

## 2019-09-09 PROCEDURE — 25000128 H RX IP 250 OP 636: Performed by: RADIOLOGY

## 2019-09-09 PROCEDURE — 64490 INJ PARAVERT F JNT C/T 1 LEV: CPT | Mod: TC,LT

## 2019-09-09 RX ORDER — LIDOCAINE HYDROCHLORIDE 10 MG/ML
INJECTION, SOLUTION EPIDURAL; INFILTRATION; INTRACAUDAL; PERINEURAL
Status: DISCONTINUED
Start: 2019-09-09 | End: 2019-09-10 | Stop reason: HOSPADM

## 2019-09-09 RX ORDER — ROPIVACAINE HYDROCHLORIDE 10 MG/ML
10 INJECTION EPIDURAL; INFILTRATION; PERINEURAL ONCE
Status: COMPLETED | OUTPATIENT
Start: 2019-09-09 | End: 2019-09-09

## 2019-09-09 RX ORDER — ROPIVACAINE HYDROCHLORIDE 10 MG/ML
INJECTION EPIDURAL; INFILTRATION; PERINEURAL
Status: DISCONTINUED
Start: 2019-09-09 | End: 2019-09-10 | Stop reason: HOSPADM

## 2019-09-09 RX ORDER — LIDOCAINE HYDROCHLORIDE 10 MG/ML
5 INJECTION, SOLUTION INFILTRATION; PERINEURAL ONCE
Status: COMPLETED | OUTPATIENT
Start: 2019-09-09 | End: 2019-09-09

## 2019-09-09 RX ADMIN — ROPIVACAINE HYDROCHLORIDE 3 ML: 10 INJECTION, SOLUTION EPIDURAL at 14:06

## 2019-09-09 RX ADMIN — LIDOCAINE HYDROCHLORIDE 4 ML: 10 INJECTION, SOLUTION INFILTRATION; PERINEURAL at 14:06

## 2019-09-09 NOTE — DISCHARGE INSTRUCTIONS
Medial Branch Blocks Workup Discharge Instructions    To obtain authorization for a Rhizotomy (Radiofrequency Ablation), we are required to document the percent of relief of the Medial Branch Block injection after one hour.  Therefore, please call and report this to us an hour after your procedure.  If no one is available to answer, it will go to voicemail:  Please leave your name, phone number, and the relief you have obtained from your injection.    Report your pain level between 1 and 10 with 10 being the worst pain ever.  Please do not leave a range of relief; we require a specific number between 1 and 10.  Without this information, your injection pain relief will not be documented as required for your insurance company. Calling is a crucial step of the Medial Branch Block injection and Rhizotomy workup.  Please call [phone] at 3:05  PM    Thank you,  [facility] Care Team

## 2019-09-11 ENCOUNTER — HOSPITAL ENCOUNTER (OUTPATIENT)
Dept: INTERVENTIONAL RADIOLOGY/VASCULAR | Facility: HOSPITAL | Age: 44
Discharge: HOME | End: 2019-09-11
Attending: PHYSICAL MEDICINE & REHABILITATION | Admitting: PHYSICAL MEDICINE & REHABILITATION
Payer: COMMERCIAL

## 2019-09-11 DIAGNOSIS — M47.812 SPONDYLOSIS OF CERVICAL REGION WITHOUT MYELOPATHY OR RADICULOPATHY: ICD-10-CM

## 2019-09-11 PROCEDURE — 25000125 ZZHC RX 250: Performed by: RADIOLOGY

## 2019-09-11 PROCEDURE — 25000128 H RX IP 250 OP 636: Performed by: RADIOLOGY

## 2019-09-11 PROCEDURE — 64490 INJ PARAVERT F JNT C/T 1 LEV: CPT | Mod: TC,RT

## 2019-09-11 RX ORDER — ROPIVACAINE HYDROCHLORIDE 10 MG/ML
10 INJECTION EPIDURAL; INFILTRATION; PERINEURAL ONCE
Status: COMPLETED | OUTPATIENT
Start: 2019-09-11 | End: 2019-09-11

## 2019-09-11 RX ORDER — ROPIVACAINE HYDROCHLORIDE 10 MG/ML
INJECTION EPIDURAL; INFILTRATION; PERINEURAL
Status: DISCONTINUED
Start: 2019-09-11 | End: 2019-09-12 | Stop reason: HOSPADM

## 2019-09-11 RX ORDER — LIDOCAINE HYDROCHLORIDE 10 MG/ML
5 INJECTION, SOLUTION INFILTRATION; PERINEURAL ONCE
Status: COMPLETED | OUTPATIENT
Start: 2019-09-11 | End: 2019-09-11

## 2019-09-11 RX ORDER — LIDOCAINE HYDROCHLORIDE 10 MG/ML
INJECTION, SOLUTION EPIDURAL; INFILTRATION; INTRACAUDAL; PERINEURAL
Status: DISCONTINUED
Start: 2019-09-11 | End: 2019-09-12 | Stop reason: HOSPADM

## 2019-09-11 RX ADMIN — LIDOCAINE HYDROCHLORIDE 4 ML: 10 INJECTION, SOLUTION EPIDURAL; INFILTRATION; INTRACAUDAL; PERINEURAL at 13:48

## 2019-09-11 RX ADMIN — ROPIVACAINE HYDROCHLORIDE 3 ML: 10 INJECTION, SOLUTION EPIDURAL at 13:50

## 2019-09-11 NOTE — DISCHARGE INSTRUCTIONS
Medial Branch Blocks Workup Discharge Instructions    To obtain authorization for a Rhizotomy (Radiofrequency Ablation), we are required to document the percent of relief of the Medial Branch Block injection after one hour.  Therefore, please call and report this to us an hour after your procedure.  If no one is available to answer, it will go to voicemail:  Please leave your name, phone number, and the relief you have obtained from your injection.    Report your pain level between 1 and 10 with 10 being the worst pain ever.  Please do not leave a range of relief; we require a specific number between 1 and 10.  Without this information, your injection pain relief will not be documented as required for your insurance company. Calling is a crucial step of the Medial Branch Block injection and Rhizotomy workup.  Please call [phone] at 2:50 PM    Thank you,  [facility] Team

## 2019-09-12 ENCOUNTER — HOSPITAL ENCOUNTER (OUTPATIENT)
Dept: MRI IMAGING | Facility: HOSPITAL | Age: 44
Discharge: HOME | End: 2019-09-12
Attending: ORTHOPAEDIC SURGERY | Admitting: ORTHOPAEDIC SURGERY
Payer: COMMERCIAL

## 2019-09-12 DIAGNOSIS — M25.562 LEFT KNEE PAIN: ICD-10-CM

## 2019-09-12 PROCEDURE — 73721 MRI JNT OF LWR EXTRE W/O DYE: CPT | Mod: TC,LT

## 2019-09-13 ENCOUNTER — TELEPHONE (OUTPATIENT)
Dept: INTERVENTIONAL RADIOLOGY/VASCULAR | Facility: HOSPITAL | Age: 44
End: 2019-09-13

## 2019-09-13 NOTE — TELEPHONE ENCOUNTER
INJECTION POST CALL    Procedure: Epidural L3-4 TL  Radiologist(s): Dr. Feranndo Hurd  Date of Procedure:  8/30/19    Pre-procedure pain score was: 6 (See pre-procedure score)  Post-procedure pain score as of today is: 7  Percentage of pain improvement today?  0%  Where is the pain located? Lower back on the left side. Pain is located a level higher up on the spine from where this injection was.   Is this new pain? Yes  Would you like to be scheduled for an additional injection?  No, patient has to wait to have anymore steroids.       I responded to the patient's questions/concerns.    Patient will contact the provider if there are any issues.      Patient states this injection helped, but pain is now located on the right side of the lower back a little bit above where this injection was. Feels like a sharp pain in that area when breathing in and also a pressure feeling/nerve pain. Patient has an appointment for cervical rhizotomy 9/25/19 and also has an appointment to meet with .     Mireya Leblanc

## 2019-09-19 RX ORDER — NALOXONE HYDROCHLORIDE 0.4 MG/ML
.1-.4 INJECTION, SOLUTION INTRAMUSCULAR; INTRAVENOUS; SUBCUTANEOUS
Status: CANCELLED | OUTPATIENT
Start: 2019-09-25

## 2019-09-19 RX ORDER — FENTANYL CITRATE 50 UG/ML
50-100 INJECTION, SOLUTION INTRAMUSCULAR; INTRAVENOUS
Status: CANCELLED | OUTPATIENT
Start: 2019-09-25

## 2019-09-19 RX ORDER — SODIUM CHLORIDE 9 MG/ML
INJECTION, SOLUTION INTRAVENOUS CONTINUOUS
Status: CANCELLED | OUTPATIENT
Start: 2019-09-25

## 2019-09-19 RX ORDER — FLUMAZENIL 0.1 MG/ML
0.2 INJECTION, SOLUTION INTRAVENOUS
Status: CANCELLED | OUTPATIENT
Start: 2019-09-25

## 2019-09-23 ENCOUNTER — TRANSFERRED RECORDS (OUTPATIENT)
Dept: HEALTH INFORMATION MANAGEMENT | Facility: HOSPITAL | Age: 44
End: 2019-09-23

## 2019-09-23 LAB
CREAT SERPL-MCNC: 0.93 MG/DL (ref 0.8–1.5)
GFR SERPL CREATININE-BSD FRML MDRD: >60 ML/MIN/1.73M2
GLUCOSE SERPL-MCNC: 107 MG/DL (ref 60–99)
INR PPP: 1 (ref 0.9–1.2)
POTASSIUM SERPL-SCNC: 3.6 MEQ/L (ref 3.5–5.1)

## 2019-09-24 ENCOUNTER — TELEPHONE (OUTPATIENT)
Dept: INTERVENTIONAL RADIOLOGY/VASCULAR | Facility: HOSPITAL | Age: 44
End: 2019-09-24

## 2019-09-25 ENCOUNTER — HOSPITAL ENCOUNTER (OUTPATIENT)
Dept: INTERVENTIONAL RADIOLOGY/VASCULAR | Facility: HOSPITAL | Age: 44
Discharge: HOME | End: 2019-09-25
Attending: PHYSICAL MEDICINE & REHABILITATION | Admitting: PHYSICAL MEDICINE & REHABILITATION
Payer: COMMERCIAL

## 2019-09-25 VITALS
TEMPERATURE: 98 F | RESPIRATION RATE: 16 BRPM | OXYGEN SATURATION: 98 % | DIASTOLIC BLOOD PRESSURE: 100 MMHG | SYSTOLIC BLOOD PRESSURE: 127 MMHG

## 2019-09-25 DIAGNOSIS — M47.812 SPONDYLOSIS OF CERVICAL REGION WITHOUT MYELOPATHY OR RADICULOPATHY: ICD-10-CM

## 2019-09-25 PROCEDURE — 25000125 ZZHC RX 250

## 2019-09-25 PROCEDURE — 27211425 XR CERVICAL RADIOFREQ ABLATION UNILATERAL: Mod: TC

## 2019-09-25 PROCEDURE — 25000128 H RX IP 250 OP 636: Performed by: RADIOLOGY

## 2019-09-25 PROCEDURE — 25800030 ZZH RX IP 258 OP 636: Performed by: RADIOLOGY

## 2019-09-25 PROCEDURE — 25000128 H RX IP 250 OP 636

## 2019-09-25 RX ORDER — FENTANYL CITRATE 50 UG/ML
50-100 INJECTION, SOLUTION INTRAMUSCULAR; INTRAVENOUS
Status: DISCONTINUED | OUTPATIENT
Start: 2019-09-25 | End: 2019-09-26 | Stop reason: HOSPADM

## 2019-09-25 RX ORDER — PANTOPRAZOLE SODIUM 40 MG/1
40 FOR SUSPENSION ORAL DAILY
COMMUNITY
End: 2019-11-12

## 2019-09-25 RX ORDER — NALOXONE HYDROCHLORIDE 0.4 MG/ML
.1-.4 INJECTION, SOLUTION INTRAMUSCULAR; INTRAVENOUS; SUBCUTANEOUS
Status: DISCONTINUED | OUTPATIENT
Start: 2019-09-25 | End: 2019-09-26 | Stop reason: HOSPADM

## 2019-09-25 RX ORDER — ROPIVACAINE HYDROCHLORIDE 10 MG/ML
10 INJECTION EPIDURAL; INFILTRATION; PERINEURAL ONCE
Status: COMPLETED | OUTPATIENT
Start: 2019-09-25 | End: 2019-09-25

## 2019-09-25 RX ORDER — METHYLPREDNISOLONE ACETATE 80 MG/ML
INJECTION, SUSPENSION INTRA-ARTICULAR; INTRALESIONAL; INTRAMUSCULAR; SOFT TISSUE
Status: COMPLETED
Start: 2019-09-25 | End: 2019-09-25

## 2019-09-25 RX ORDER — ROPIVACAINE HYDROCHLORIDE 10 MG/ML
INJECTION EPIDURAL; INFILTRATION; PERINEURAL
Status: COMPLETED
Start: 2019-09-25 | End: 2019-09-25

## 2019-09-25 RX ORDER — LIDOCAINE HYDROCHLORIDE 10 MG/ML
INJECTION, SOLUTION EPIDURAL; INFILTRATION; INTRACAUDAL; PERINEURAL
Status: COMPLETED
Start: 2019-09-25 | End: 2019-09-25

## 2019-09-25 RX ORDER — METHYLPREDNISOLONE ACETATE 80 MG/ML
80 INJECTION, SUSPENSION INTRA-ARTICULAR; INTRALESIONAL; INTRAMUSCULAR; SOFT TISSUE ONCE
Status: COMPLETED | OUTPATIENT
Start: 2019-09-25 | End: 2019-09-25

## 2019-09-25 RX ORDER — SODIUM CHLORIDE 9 MG/ML
INJECTION, SOLUTION INTRAVENOUS CONTINUOUS
Status: DISCONTINUED | OUTPATIENT
Start: 2019-09-25 | End: 2019-09-26 | Stop reason: HOSPADM

## 2019-09-25 RX ORDER — FLUMAZENIL 0.1 MG/ML
0.2 INJECTION, SOLUTION INTRAVENOUS
Status: DISCONTINUED | OUTPATIENT
Start: 2019-09-25 | End: 2019-09-26 | Stop reason: HOSPADM

## 2019-09-25 RX ORDER — OXYCODONE AND ACETAMINOPHEN 5; 325 MG/1; MG/1
1-2 TABLET ORAL EVERY 4 HOURS PRN
Qty: 60 TABLET | Refills: 0 | Status: SHIPPED | OUTPATIENT
Start: 2019-09-25 | End: 2019-11-12

## 2019-09-25 RX ADMIN — MIDAZOLAM 1 MG: 1 INJECTION INTRAMUSCULAR; INTRAVENOUS at 08:33

## 2019-09-25 RX ADMIN — LIDOCAINE HYDROCHLORIDE 6 ML: 10 INJECTION, SOLUTION EPIDURAL; INFILTRATION; INTRACAUDAL; PERINEURAL at 09:05

## 2019-09-25 RX ADMIN — METHYLPREDNISOLONE ACETATE 80 MG: 80 INJECTION, SUSPENSION INTRA-ARTICULAR; INTRALESIONAL; INTRAMUSCULAR; SOFT TISSUE at 09:02

## 2019-09-25 RX ADMIN — MIDAZOLAM 1 MG: 1 INJECTION INTRAMUSCULAR; INTRAVENOUS at 09:01

## 2019-09-25 RX ADMIN — FENTANYL CITRATE 50 MCG: 50 INJECTION INTRAMUSCULAR; INTRAVENOUS at 08:33

## 2019-09-25 RX ADMIN — FENTANYL CITRATE 50 MCG: 50 INJECTION INTRAMUSCULAR; INTRAVENOUS at 08:38

## 2019-09-25 RX ADMIN — SODIUM CHLORIDE: 9 INJECTION, SOLUTION INTRAVENOUS at 08:25

## 2019-09-25 RX ADMIN — MIDAZOLAM 1 MG: 1 INJECTION INTRAMUSCULAR; INTRAVENOUS at 08:38

## 2019-09-25 RX ADMIN — ROPIVACAINE HYDROCHLORIDE 4.5 ML: 10 INJECTION, SOLUTION EPIDURAL at 09:04

## 2019-09-25 RX ADMIN — ROPIVACAINE HYDROCHLORIDE 4.5 ML: 10 INJECTION EPIDURAL; INFILTRATION; PERINEURAL at 09:04

## 2019-09-25 RX ADMIN — FENTANYL CITRATE 50 MCG: 50 INJECTION INTRAMUSCULAR; INTRAVENOUS at 09:00

## 2019-09-25 NOTE — PROGRESS NOTES
[facility] Procedure Note        Sedation:      Performed by: Ld Lopez MD  Authorized by: Ld Lopez MD    Pre-Procedure Assessment done at 0800.    Expected Level:  Moderate Sedation    Indication:  Sedation is required to allow for Facet Rhizotomy    Consent obtained from patient after discussing the risks, benefits and alternatives.     Review of Anesthesia/Sedation History Completed: YES     Sleep Apnea Assessment Completed: YES    PO Intake:  Appropriately NPO for procedure    ASA Class:  Class 1 - HEALTHY PATIENT    Mallampati:  Grade 1:  Soft palate, uvula, tonsillar pillars, and posterior pharyngeal wall visible    Lungs: Lungs Clear with good breath sounds bilaterally.     Heart: Normal heart sounds and rate    History and physical reviewed and no updates needed. I have reviewed the lab findings, diagnostic data, medications, and the plan for sedation. I have determined this patient to be an appropriate candidate for the planned sedation and procedure and have reassessed the patient IMMEDIATELY PRIOR to sedation and procedure.

## 2019-09-25 NOTE — IP AVS SNAPSHOT
MRN:[id number]                      After Visit Summary   9/25/2019    Samuel Bravo    MRN: [id number]           Visit Information        Provider Department      9/25/2019  8:30 AM HIIRRAD; HIXRRN; HIIR2 [facility] INTERVENTIONAL RAD           Review of your medicines      UNREVIEWED medicines. Ask your doctor about these medicines       Dose / Directions   pantoprazole sodium 40 MG packet  Commonly known as:  PROTONIX      Dose:  40 mg  Take 40 mg by mouth daily  Refills:  0        START taking       Dose / Directions   oxyCODONE-acetaminophen 5-325 MG tablet  Commonly known as:  PERCOCET  Used for:  Spondylosis of cervical region without myelopathy or radiculopathy      Dose:  1-2 tablet  Take 1-2 tablets by mouth every 4 hours as needed for pain  Quantity:  60 tablet  Refills:  0     tiZANidine 4 MG tablet  Commonly known as:  ZANAFLEX  Used for:  Spondylosis of cervical region without myelopathy or radiculopathy      Dose:  4-8 mg  Take 1-2 tablets (4-8 mg) by mouth 3 times daily as needed for muscle spasms  Quantity:  30 tablet  Refills:  1           Where to get your medicines      These medications were [facility] to [facility] [address] [address]    Phone:  [phone]   tiZANidine 4 MG tablet     Some of these will need a paper prescription and others can be bought over the counter. Ask your nurse if you have questions.    Bring a paper prescription for each of these medications  oxyCODONE-acetaminophen 5-325 MG tablet           Prescriptions were sent or printed at these locations (2 Prescriptions)            [facility] [address] [address]    Telephone:  [phone]   Fax:  [phone]   Hours:                  E-Prescribed (1 of 2)         tiZANidine (ZANAFLEX) 4 MG tablet                 Printed at Department/Unit printer (1 of 2)          oxyCODONE-acetaminophen (PERCOCET) 5-325 MG tablet                Protect others around you: Learn how to safely use, store and throw away your medicines at www.disposemymeds.org.       Follow-ups after your visit       Your next 10 appointments already scheduled    Oct 09, 2019  8:30 AM CDT  (Arrive by 7:30 AM)  XR CERVICAL RADIOFREQ ABLATION UNILATERAL with HIIR2, HIXRRN, HIIRRAD  HI INTERVENTIONAL RAD ([facility] ) [address] [address] [phone]   How do I prepare for my exam? (Food and drink instructions)  Stop all food and drink (including water) 3 hours before your test or treatment.    How do I prepare for my exam? (Other instructions)  Stop taking the following medicines (but talk to your doctor first):  * If you take blood thinners, you may need to stop taking them a few days before treatment. Talk to your doctor before stopping these medicines. Stop taking Coumadin (warfarin) 3 days before treatment. Restart the day after treatment.  * If you take Plavix, Ticlid, Pletal or Persantine, please ask your doctor if you should stop these medicines. You may need extra tests on the morning of your scan.  * If you take Xarelto (Rivaroxaban), you may need to stop taking it 24 hours before treatment. Talk to your doctor before stopping this medicine. Restart the day after treatment.    You may take your other medicines as normal.    What should I wear: Wear comfortable clothes.    How long does the exam take: Injections take about 30 to 45 minutes. Most people spend up to 2 hours in the clinic or hospital.    What should I bring: For nerve root injection, please send or bring copies of any MRIs or other scans you have had. Please bring a list of your current medicines to your exam. Include vitamins, minerals and over-the-counter medicines.    Do I need a : Plan to have someone drive you home afterward.    What do I need to tell my doctor:  Tell your doctor if:  * You have  ever had an allergic reaction to X-ray dye (contrast fluid).  * If there s any chance you are pregnant.    What should I do after the exam: We may ask you to lie down for a short time before you go home. If you had a nerve shot and your arm or leg feels numb, you will stay for up to two hours until the numbness wears off. You may return to your normal activities the next day.    What is this test: A spinal shot is done in or near the spine. You may receive steroid medicine (to reduce swelling) or contrast fluid (dye that makes the area show up more clearly on X-rays).    Who should I call with questions: If you have any questions, please call the Imaging Department where you will have your exam. Directions, parking instructions, and other information is available on our website, [email or web address].        Care Instructions       Further instructions from your care team       Rhizotomy Discharge Instructions    You will be discharged to home with karthikeyan vences Wheelchair    Follow Up Appointment is for:  Cervical       09/25/19      Do NOT drive or operate machinery today or engage in strenuous activity      Resume eating      Apply ice to this area today and warm moist heat tomorrow      Do NOT bath, shower only      We will be contacting you to follow up on your procedure on the next business day.      Risks of radiofrequency denervation    More pain after the procedure for a period of time    Mild burning feeling that may last up to 3 weeks    Numbness in the area that may last up to 4 months    Please call your provider if you experience signs/symptoms of an infection: redness, swelling, drainage, fever (over 100.5). If you cannot reach the office please proceed to nearest ER.    IMMEDIATELY FOLLOWING YOUR RHIZOTOMY: Do not drive or operate machinery for the first twenty four hours after surgery. Do not make any important decisions for twenty four hours after surgery or while taking narcotic pain medications or  sedatives. If you develop intractable nausea and vomiting or a severe headache please notify your doctor immediately.    FOLLOW-UP: Please make an appointment with your surgeon as instructed. You do not need to follow up with anesthesia unless specifically instructed to do so.    QUESTIONS?: Please feel free to call your physician or the hospital  if you have any questions, and they will be happy to assist you.    Rhizotomy Discharge Instructions            Information about OPIOIDS    PRESCRIPTION OPIOIDS: WHAT YOU NEED TO KNOW   We gave you an opioid (narcotic) pain medicine. It is important to manage your pain, but opioids are not always the best choice. You should first try all the other options your care team gave you. Take this medicine for as short a time (and as few doses) as possible.    Some activities can increase your pain, such as bandage changes or therapy sessions. It may help to take your pain medicine 30 to 60 minutes before these activities. Reduce your stress by getting enough sleep, working on hobbies you enjoy and practicing relaxation or meditation. Talk to your care team about ways to manage your pain beyond prescription opioids.    These medicines have risks:    DO NOT drive when on new or higher doses of pain medicine. These medicines can affect your alertness and reaction times, and you could be arrested for driving under the influence (DUI). If you need to use opioids long-term, talk to your care team about driving.    DO NOT operate heavy machinery    DO NOT do any other dangerous activities while taking these medicines.    DO NOT drink any alcohol while taking these medicines.     If the opioid prescribed includes acetaminophen, DO NOT take with any other medicines that contain acetaminophen. Read all labels carefully. Look for the word  acetaminophen  or  Tylenol.  Ask your pharmacist if you have questions or are unsure.    You can get addicted to pain medicines, especially if  you have a history of addiction (chemical, alcohol or substance dependence). Talk to your care team about ways to reduce this risk.    All opioids tend to cause constipation. Drink plenty of water and eat foods that have a lot of fiber, such as fruits, vegetables, prune juice, apple juice and high-fiber cereal. Take a laxative (Miralax, milk of magnesia, Colace, Senna) if you don t move your bowels at least every other day. Other side effects include upset stomach, sleepiness, dizziness, throwing up, tolerance (needing more of the medicine to have the same effect), physical dependence and slowed breathing.    Store your pills in a secure place, locked if possible. We will not replace any lost or stolen medicine. If you don t finish your medicine, please throw away (dispose) as directed by your pharmacist. The [place] [employer]ution [employer] has more information about safe disposal: https://www.pca.[facility].[place].us/living-green/managing-unwanted-medications       Additional Information About Your Visit       Care EveryWhere ID    This is your Care EveryWhere ID. This could be used by other organizations to access your [facility] medical records  [id number]       Your Vitals Were  Most recent update: 9/25/2019  9:37 AM    Blood Pressure   128/105      Temperature   98  F (36.7  C) (Oral)    Respirations   18    Pulse Oximetry   98%           Primary Care Provider Office Phone # Fax #    Benjie Ramos [phone]      Equal Access to Services    [facility]: Hadii lupe mchugh hadasho Soomaali, waaxda luqadaha, qaybta kaalmada adejordanyada, denver escalante . So [facility] [phone].    ATENCIÓN: Si habla español, tiene a agrawal disposición servicios gratuitos de asistencia lingüística. Llame al [phone].    We comply with applicable federal civil rights laws and [place] laws. We do not discriminate on the basis of race, color, national origin, age, disability, sex, sexual orientation,  or gender identity.           Thank you!    Thank you for choosing [facility] for your care. Our goal is always to provide you with excellent care. Hearing back from our patients is one way we can continue to improve our services. Please take a few minutes to complete the written survey that you may receive in the mail after you visit with us. Thank you!            Medication List      Medications          Morning Afternoon Evening Bedtime As Needed    oxyCODONE-acetaminophen 5-325 MG tablet  Also known as:  PERCOCET  INSTRUCTIONS:  Take 1-2 tablets by mouth every 4 hours as needed for pain                     tiZANidine 4 MG tablet  Also known as:  ZANAFLEX  INSTRUCTIONS:  Take 1-2 tablets (4-8 mg) by mouth 3 times daily as needed for muscle spasms                       ASK your doctor about these medications          Morning Afternoon Evening Bedtime As Needed    pantoprazole sodium 40 MG packet  Also known as:  PROTONIX  INSTRUCTIONS:  Take 40 mg by mouth daily

## 2019-09-25 NOTE — DISCHARGE INSTRUCTIONS
Rhizotomy Discharge Instructions    You will be discharged to home with karthikeyan Nunez    Follow Up Appointment is for:  Cervical       09/25/19      Do NOT drive or operate machinery today or engage in strenuous activity      Resume eating      Apply ice to this area today and warm moist heat tomorrow      Do NOT bath, shower only      We will be contacting you to follow up on your procedure on the next business day.      Risks of radiofrequency denervation    More pain after the procedure for a period of time    Mild burning feeling that may last up to 3 weeks    Numbness in the area that may last up to 4 months    Please call your provider if you experience signs/symptoms of an infection: redness, swelling, drainage, fever (over 100.5). If you cannot reach the office please proceed to nearest ER.    IMMEDIATELY FOLLOWING YOUR RHIZOTOMY: Do not drive or operate machinery for the first twenty four hours after surgery. Do not make any important decisions for twenty four hours after surgery or while taking narcotic pain medications or sedatives. If you develop intractable nausea and vomiting or a severe headache please notify your doctor immediately.    FOLLOW-UP: Please make an appointment with your surgeon as instructed. You do not need to follow up with anesthesia unless specifically instructed to do so.    QUESTIONS?: Please feel free to call your physician or the hospital  if you have any questions, and they will be happy to assist you.    Rhizotomy Discharge Instructions

## 2019-09-25 NOTE — IP AVS SNAPSHOT
HI INTERVENTIONAL RAD  [address] [address]  Phone:  [phone]  Fax:  [phone]                                    After Visit Summary   9/25/2019    Samuel Bravo    MRN: [id number]           After Visit Summary Signature Page    I have received my discharge instructions, and my questions have been answered. I have discussed any challenges I see with this plan with the nurse or doctor.    ..........................................................................................................................................  Patient/Patient Representative Signature      ..........................................................................................................................................  Patient Representative Print Name and Relationship to Patient    ..................................................               ................................................  Date                                   Time    ..........................................................................................................................................  Reviewed by Signature/Title    ...................................................              ..............................................  Date                                               Time          22EPIC Rev 08/18

## 2019-09-25 NOTE — PROGRESS NOTES
[facility].  Awake and alert sitting up and drinking and eating.  3 band aids in place, dry and intact.

## 2019-09-25 NOTE — SEDATION DOCUMENTATION
Procedure: image guided facet joint rhizotomy cervical, left    There were  no complications and patient has no symptoms..      Tolerated procedure well.    Radiologist:Dr. Lopez    Time Out: Prior to the start of the procedure and with procedural staff participation, I verbally confirmed the patient s identity using two indicators, relevant allergies, that the procedure was appropriate and matched the consent or emergent situation, and that the correct equipment/implants were available. Immediately prior to starting the procedure I conducted the Time Out with the procedural staff and re-confirmed the patient s name, procedure, and site/side. (The Joint Commission universal protocol was followed.)  Yes    Position:  prone    Pain:  0    Sedation:  Moderate (conscious) sedation:  TIME IN MINUTES FROM START OF SEDATION TO END OF PROCEDURE:  45 minutes.  Fentanyl 150 mcg, Versed 3 mg    Estimated Blood Loss: None     Condition: Stable    Comments: See dictated procedure note for full details     Faustina Martin RN

## 2019-09-26 ENCOUNTER — TELEPHONE (OUTPATIENT)
Dept: INTERVENTIONAL RADIOLOGY/VASCULAR | Facility: HOSPITAL | Age: 44
End: 2019-09-26

## 2019-10-02 ENCOUNTER — TELEPHONE (OUTPATIENT)
Dept: INTERVENTIONAL RADIOLOGY/VASCULAR | Facility: HOSPITAL | Age: 44
End: 2019-10-02

## 2019-10-02 NOTE — TELEPHONE ENCOUNTER
Called patient to inform him that the order we have for his Right Cervical Rhizotomy only shows level C4. In order to include levels C5 and C6, patient is advised to contact primary to send a new order over for a Rhizotomy targeting levels C4, C5, and C6 diagnoses being cervical spondylosis. Patients procedure is scheduled for 10/9/19.

## 2019-10-10 ENCOUNTER — TELEPHONE (OUTPATIENT)
Dept: INTERVENTIONAL RADIOLOGY/VASCULAR | Facility: HOSPITAL | Age: 44
End: 2019-10-10

## 2019-10-10 NOTE — TELEPHONE ENCOUNTER
2 WEEKS POST RHIZOTOMY CALL    Procedure: left Cervical Rhizotomy.  Radiologist(s): Dr. Ld Lopez  Date of Procedure: 9/25/2019    The patient had no questions or concerns.    Post-procedure pain score as of today is: 2    Where is the pain? Neck, states doing better with the left but right side flared up.  Is this new pain? No    Faustina Martin RN

## 2019-11-08 RX ORDER — FLUMAZENIL 0.1 MG/ML
0.2 INJECTION, SOLUTION INTRAVENOUS
Status: CANCELLED | OUTPATIENT
Start: 2019-11-12

## 2019-11-08 RX ORDER — SODIUM CHLORIDE 9 MG/ML
INJECTION, SOLUTION INTRAVENOUS CONTINUOUS
Status: CANCELLED | OUTPATIENT
Start: 2019-11-08

## 2019-11-08 RX ORDER — NALOXONE HYDROCHLORIDE 0.4 MG/ML
.1-.4 INJECTION, SOLUTION INTRAMUSCULAR; INTRAVENOUS; SUBCUTANEOUS
Status: CANCELLED | OUTPATIENT
Start: 2019-11-12

## 2019-11-08 RX ORDER — FENTANYL CITRATE 50 UG/ML
25-50 INJECTION, SOLUTION INTRAMUSCULAR; INTRAVENOUS EVERY 5 MIN PRN
Status: CANCELLED | OUTPATIENT
Start: 2019-11-12

## 2019-11-11 ENCOUNTER — TELEPHONE (OUTPATIENT)
Dept: INTERVENTIONAL RADIOLOGY/VASCULAR | Facility: HOSPITAL | Age: 44
End: 2019-11-11

## 2019-11-12 ENCOUNTER — HOSPITAL ENCOUNTER (OUTPATIENT)
Dept: INTERVENTIONAL RADIOLOGY/VASCULAR | Facility: HOSPITAL | Age: 44
Discharge: HOME | End: 2019-11-12
Attending: FAMILY MEDICINE | Admitting: FAMILY MEDICINE
Payer: COMMERCIAL

## 2019-11-12 VITALS
DIASTOLIC BLOOD PRESSURE: 98 MMHG | OXYGEN SATURATION: 97 % | SYSTOLIC BLOOD PRESSURE: 121 MMHG | RESPIRATION RATE: 14 BRPM | TEMPERATURE: 97.9 F | HEART RATE: 82 BPM

## 2019-11-12 DIAGNOSIS — M47.812 SPONDYLOSIS OF CERVICAL REGION WITHOUT MYELOPATHY OR RADICULOPATHY: ICD-10-CM

## 2019-11-12 PROCEDURE — 25000125 ZZHC RX 250

## 2019-11-12 PROCEDURE — 25000128 H RX IP 250 OP 636

## 2019-11-12 PROCEDURE — 27211425 XR CERVICAL RADIOFREQ ABLATION UNILATERAL: Mod: TC

## 2019-11-12 PROCEDURE — 25000128 H RX IP 250 OP 636: Performed by: RADIOLOGY

## 2019-11-12 PROCEDURE — 25800030 ZZH RX IP 258 OP 636: Performed by: RADIOLOGY

## 2019-11-12 RX ORDER — LIDOCAINE HYDROCHLORIDE 10 MG/ML
INJECTION, SOLUTION EPIDURAL; INFILTRATION; INTRACAUDAL; PERINEURAL
Status: COMPLETED
Start: 2019-11-12 | End: 2019-11-12

## 2019-11-12 RX ORDER — OXYCODONE AND ACETAMINOPHEN 5; 325 MG/1; MG/1
1-2 TABLET ORAL EVERY 6 HOURS PRN
Qty: 40 TABLET | Refills: 0 | Status: SHIPPED | OUTPATIENT
Start: 2019-11-12 | End: 2019-11-17

## 2019-11-12 RX ORDER — LIDOCAINE HYDROCHLORIDE 10 MG/ML
10 INJECTION, SOLUTION INFILTRATION; PERINEURAL ONCE
Status: COMPLETED | OUTPATIENT
Start: 2019-11-12 | End: 2019-11-12

## 2019-11-12 RX ORDER — FENTANYL CITRATE 50 UG/ML
25-50 INJECTION, SOLUTION INTRAMUSCULAR; INTRAVENOUS EVERY 5 MIN PRN
Status: DISCONTINUED | OUTPATIENT
Start: 2019-11-12 | End: 2019-11-13 | Stop reason: HOSPADM

## 2019-11-12 RX ORDER — METHYLPREDNISOLONE ACETATE 80 MG/ML
80 INJECTION, SUSPENSION INTRA-ARTICULAR; INTRALESIONAL; INTRAMUSCULAR; SOFT TISSUE ONCE
Status: DISCONTINUED | OUTPATIENT
Start: 2019-11-12 | End: 2019-11-13 | Stop reason: HOSPADM

## 2019-11-12 RX ORDER — METHYLPREDNISOLONE ACETATE 80 MG/ML
INJECTION, SUSPENSION INTRA-ARTICULAR; INTRALESIONAL; INTRAMUSCULAR; SOFT TISSUE
Status: DISCONTINUED
Start: 2019-11-12 | End: 2019-11-12 | Stop reason: WASHOUT

## 2019-11-12 RX ORDER — SODIUM CHLORIDE 9 MG/ML
INJECTION, SOLUTION INTRAVENOUS CONTINUOUS
Status: DISCONTINUED | OUTPATIENT
Start: 2019-11-12 | End: 2019-11-13 | Stop reason: HOSPADM

## 2019-11-12 RX ORDER — FLUMAZENIL 0.1 MG/ML
0.2 INJECTION, SOLUTION INTRAVENOUS
Status: DISCONTINUED | OUTPATIENT
Start: 2019-11-12 | End: 2019-11-13 | Stop reason: HOSPADM

## 2019-11-12 RX ORDER — NALOXONE HYDROCHLORIDE 0.4 MG/ML
.1-.4 INJECTION, SOLUTION INTRAMUSCULAR; INTRAVENOUS; SUBCUTANEOUS
Status: DISCONTINUED | OUTPATIENT
Start: 2019-11-12 | End: 2019-11-13 | Stop reason: HOSPADM

## 2019-11-12 RX ORDER — ROPIVACAINE HYDROCHLORIDE 10 MG/ML
INJECTION EPIDURAL; INFILTRATION; PERINEURAL
Status: COMPLETED
Start: 2019-11-12 | End: 2019-11-12

## 2019-11-12 RX ORDER — ROPIVACAINE HYDROCHLORIDE 10 MG/ML
10 INJECTION EPIDURAL; INFILTRATION; PERINEURAL ONCE
Status: COMPLETED | OUTPATIENT
Start: 2019-11-12 | End: 2019-11-12

## 2019-11-12 RX ORDER — CYCLOBENZAPRINE HCL 5 MG
5-10 TABLET ORAL 3 TIMES DAILY PRN
Qty: 30 TABLET | Refills: 1 | Status: SHIPPED | OUTPATIENT
Start: 2019-11-12 | End: 2019-11-17

## 2019-11-12 RX ADMIN — MIDAZOLAM 1 MG: 1 INJECTION INTRAMUSCULAR; INTRAVENOUS at 09:24

## 2019-11-12 RX ADMIN — ROPIVACAINE HYDROCHLORIDE 4.5 ML: 10 INJECTION EPIDURAL; INFILTRATION; PERINEURAL at 09:29

## 2019-11-12 RX ADMIN — FENTANYL CITRATE 50 MCG: 50 INJECTION, SOLUTION INTRAMUSCULAR; INTRAVENOUS at 08:54

## 2019-11-12 RX ADMIN — LIDOCAINE HYDROCHLORIDE 50 MG: 10 INJECTION, SOLUTION INFILTRATION; PERINEURAL at 09:30

## 2019-11-12 RX ADMIN — FENTANYL CITRATE 50 MCG: 50 INJECTION, SOLUTION INTRAMUSCULAR; INTRAVENOUS at 08:50

## 2019-11-12 RX ADMIN — MIDAZOLAM 1 MG: 1 INJECTION INTRAMUSCULAR; INTRAVENOUS at 08:54

## 2019-11-12 RX ADMIN — FENTANYL CITRATE 50 MCG: 50 INJECTION, SOLUTION INTRAMUSCULAR; INTRAVENOUS at 08:58

## 2019-11-12 RX ADMIN — ROPIVACAINE HYDROCHLORIDE 4.5 ML: 10 INJECTION, SOLUTION EPIDURAL at 09:29

## 2019-11-12 RX ADMIN — FENTANYL CITRATE 50 MCG: 50 INJECTION, SOLUTION INTRAMUSCULAR; INTRAVENOUS at 09:24

## 2019-11-12 RX ADMIN — LIDOCAINE HYDROCHLORIDE 50 MG: 10 INJECTION, SOLUTION EPIDURAL; INFILTRATION; INTRACAUDAL; PERINEURAL at 09:30

## 2019-11-12 RX ADMIN — MIDAZOLAM 1 MG: 1 INJECTION INTRAMUSCULAR; INTRAVENOUS at 08:58

## 2019-11-12 RX ADMIN — MIDAZOLAM 1 MG: 1 INJECTION INTRAMUSCULAR; INTRAVENOUS at 08:50

## 2019-11-12 RX ADMIN — SODIUM CHLORIDE: 9 INJECTION, SOLUTION INTRAVENOUS at 08:11

## 2019-11-12 NOTE — PROGRESS NOTES
Procedure: image guided facet joint cervical rhizotomy, right    There were  no complications and patient has no symptoms..      Tolerated procedure well.    Radiologist:Dr. Lopez    Time Out: Prior to the start of the procedure and with procedural staff participation, I verbally confirmed the patient s identity using two indicators, relevant allergies, that the procedure was appropriate and matched the consent or emergent situation, and that the correct equipment/implants were available. Immediately prior to starting the procedure I conducted the Time Out with the procedural staff and re-confirmed the patient s name, procedure, and site/side. (The Joint Commission universal protocol was followed.)  Yes    Position:  prone    Pain:  0    Sedation: Nurse monitored moderate sedation.  Versed 4mg, Fentanyl 200 mcg.  Moderate (conscious) sedation:  TIME IN MINUTES FROM START OF SEDATION TO END OF PROCEDURE:  45    Estimated Blood Loss: Minimal     Condition: Stable    Comments: See dictated procedure note for full details     Kim Murphy RN

## 2019-11-12 NOTE — IP AVS SNAPSHOT
HI INTERVENTIONAL RAD  [address] [address]  Phone:  [phone]  Fax:  [phone]                                    After Visit Summary   11/12/2019    Samuel Bravo    MRN: [id number]           After Visit Summary Signature Page    I have received my discharge instructions, and my questions have been answered. I have discussed any challenges I see with this plan with the nurse or doctor.    ..........................................................................................................................................  Patient/Patient Representative Signature      ..........................................................................................................................................  Patient Representative Print Name and Relationship to Patient    ..................................................               ................................................  Date                                   Time    ..........................................................................................................................................  Reviewed by Signature/Title    ...................................................              ..............................................  Date                                               Time          22EPIC Rev 08/18

## 2019-11-12 NOTE — DISCHARGE INSTRUCTIONS
"Rhizotomy Discharge Instructions    You will be discharged to *** with *** by {Imaging Method of Transport:487645::\"Wheelchair\"}    Follow Up Appointment is for:  {Appt Type For Rhizo:399974}       {Date of Service, Imagin}      Do NOT drive or operate machinery today or engage in strenuous activity      Resume eating      Apply ice to this area today and warm moist heat tomorrow      Do NOT bath, shower only      We will be contacting you to follow up on your procedure on the next business day.      Risks of radiofrequency denervation    More pain after the procedure for a period of time    Mild burning feeling that may last up to 3 weeks    Numbness in the area that may last up to 4 months    Please call your provider if you experience signs/symptoms of an infection: redness, swelling, drainage, fever (over 100.5). If you cannot reach the office please proceed to nearest ER.    IMMEDIATELY FOLLOWING YOUR RHIZOTOMY: Do not drive or operate machinery for the first twenty four hours after surgery. Do not make any important decisions for twenty four hours after surgery or while taking narcotic pain medications or sedatives. If you develop intractable nausea and vomiting or a severe headache please notify your doctor immediately.    FOLLOW-UP: Please make an appointment with your surgeon as instructed. You do not need to follow up with anesthesia unless specifically instructed to do so.    QUESTIONS?: Please feel free to call your physician or the hospital  if you have any questions, and they will be happy to assist you.      "

## 2019-11-12 NOTE — IP AVS SNAPSHOT
MRN:[id number]                      After Visit Summary   11/12/2019    Samuel Bravo    MRN: [id number]           Visit Information        Provider Department      11/12/2019  8:30 AM HIIRRAD; HIXRRN; HIIR2 [facility] INTER[facility]AL RAD           Review of your medicines      START taking       Dose / Directions   cyclobenzaprine 5 MG tablet  Commonly known as:  FLEXERIL  Used for:  Spondylosis of cervical region without myelopathy or radiculopathy      Dose:  5-10 mg  Take 1-2 tablets (5-10 mg) by mouth 3 times daily as needed for muscle spasms  Quantity:  30 tablet  Refills:  1     oxyCODONE-acetaminophen 5-325 MG tablet  Commonly known as:  PERCOCET  Used for:  Spondylosis of cervical region without myelopathy or radiculopathy      Dose:  1-2 tablet  Take 1-2 tablets by mouth every 6 hours as needed for pain  Quantity:  40 tablet  Refills:  0           Where to get your medicines      These medications were sent to [facility] [place] - [address]  [address] [place] [address]    Phone:  [phone]   cyclobenzaprine 5 MG tablet     Some of these will need a paper prescription and others can be bought over the counter. Ask your nurse if you have questions.    Bring a paper prescription for each of these medications  oxyCODONE-acetaminophen 5-325 MG tablet           Prescriptions were sent or printed at these locations (2 Prescriptions)            [facility] [place] - [address]   [address] [facility] [place] [address]    Telephone:  [phone]   Fax:  [phone]   Hours:                  E-Prescribed (1 of 2)         cyclobenzaprine (FLEXERIL) 5 MG tablet                 Printed at Department/Unit printer (1 of 2)         oxyCODONE-acetaminophen (PERCOCET) 5-325 MG tablet                Protect others around you: Learn how to safely use, store and throw away your medicines at www.disposemymeds.org.       Follow-ups after your visit        Your next 10 appointments already scheduled    Dec 02, 2019  3:30 PM CST  (Arrive by 3:15 PM)  XR SACROILIAC THERAPEUTIC INJECTION BILATERAL with HIIR1, HIIRRAD  [facility] INTERVENTIONAL RAD ([facility] ) [address] [phone]   How do I prepare for my exam? (Food and drink instructions)  Stop drinking 1 hour before the exam.    How do I prepare for my exam? (Other instructions)  You may take your medicines as usual, except for blood thinners (Coumadin, Plavix, Ticlid, Persantine, Aggrenox, Pletal, Effient, Brilliant). Talk to your doctor if you take these.    What should I wear: Wear comfortable clothes.    How long does the exam take: The entire exam takes about one hour. If you are having a wrist exam, you may have two shots up to two hours apart. You may leave the hospital between the shots.If your doctor has ordered a CT or MRI scan of this joint, this will take another 30 to 45 minutes.    What should I bring: Please bring a list of your current medicines to your exam. Include vitamins, minerals and over-the-counter medicines.    Do I need a : No  is needed.    What do I need to tell my doctor:  Tell your doctor if:  * You have ever had an allergic reaction to X-ray dye (contrast fluid).  * If there s any chance you are pregnant.    What should I do after the exam: Try to rest for the next 12 hours or so. You can go back to normal activities the day after your exam.    What is this test: An arthrogram is an X-ray exam of a joint. We will take a set of X-ray pictures. We will then inject dye (contrast fluid) into the area around the joint. After that, we will take another set of X-ray pictures. The dye helps your doctor see the joint better on the X-rays. With a joint injection, we will also inject a steroid into your joint after we inject the dye.    Who should I call with questions: If you have any questions, please call the Imaging Department where you  will have your exam. Directions, parking instructions, and other information is available on our website, [email or web address].        Care Instructions       After Care Instructions     Discharge Instructions      Notify procedure physician: Dr. Lopez    ~Please contact the office: If you experience signs or symptoms of an infection: redness, swelling, drainage, fever (over 100.5*F) or if you have any change in your bowel or bladder control, have increased numbness, tingling or weakness in your feet, severe leg pain or arm symptoms. If you cannot reach the office, please proceed to the nearest Emergency Department (ER).    Medication:  ~You may resume taking your pre-injection medications immediately.  If you are taking a blood thinner, you may restart the medication 12 hours after your procedure.    Sedation:   ~If you received sedation during your procedure, you must have a responsible adult drive you home.  ~Avoid driving, operating heavy machinery, or making any major decisions for 24 hours after the procedure.     Diet:  ~Resume your previous diet    Activity  ~You may experience increased discomfort for 24-72 hours after the injection due to the irritation from the placement of the needle.  Limited activity and rest are recommended for this time period.  Avoid heavy lifting, unusual positions, vigorous exercise or other maneuvers which can exacerbate your pain. You maygradually resume regular activities as your discomfort subsides.  Maximum results may not be achieved until 2-6 weeks post procedure.    Care of injection site  ~Shower only (do not submerge body in water today).  It is common to have some pain and discomfort at the procedure site.  Use ice only for the first 24 hours. Apply ice to the area 20 minutes on then 20 minutes off. You may use moist heat or ice after 24 hours (whichever you prefer.)           Further instructions from your care team       Rhizotomy Discharge Instructions    You will  "be discharged to *** with *** by {Imaging Method of Transport:972858::\"Wheelchair\"}    Follow Up Appointment is for:  {Appt Type For Rhizo:857393}       {Date of Service, Imagin}      Do NOT drive or operate machinery today or engage in strenuous activity      Resume eating      Apply ice to this area today and warm moist heat tomorrow      Do NOT bath, shower only      We will be contacting you to follow up on your procedure on the next business day.      Risks of radiofrequency denervation    More pain after the procedure for a period of time    Mild burning feeling that may last up to 3 weeks    Numbness in the area that may last up to 4 months    Please call your provider if you experience signs/symptoms of an infection: redness, swelling, drainage, fever (over 100.5). If you cannot reach the office please proceed to nearest ER.    IMMEDIATELY FOLLOWING YOUR RHIZOTOMY: Do not drive or operate machinery for the first twenty four hours after surgery. Do not make any important decisions for twenty four hours after surgery or while taking narcotic pain medications or sedatives. If you develop intractable nausea and vomiting or a severe headache please notify your doctor immediately.    FOLLOW-UP: Please make an appointment with your surgeon as instructed. You do not need to follow up with anesthesia unless specifically instructed to do so.    QUESTIONS?: Please feel free to call your physician or the hospital  if you have any questions, and they will be happy to assist you.        Information about OPIOIDS    PRESCRIPTION OPIOIDS: WHAT YOU NEED TO KNOW   We gave you an opioid (narcotic) pain medicine. It is important to manage your pain, but opioids are not always the best choice. You should first try all the other options your care team gave you. Take this medicine for as short a time (and as few doses) as possible.    Some activities can increase your pain, such as bandage changes or therapy " sessions. It may help to take your pain medicine 30 to 60 minutes before these activities. Reduce your stress by getting enough sleep, working on hobbies you enjoy and practicing relaxation or meditation. Talk to your care team about ways to manage your pain beyond prescription opioids.    These medicines have risks:    DO NOT drive when on new or higher doses of pain medicine. These medicines can affect your alertness and reaction times, and you could be arrested for driving under the influence (DUI). If you need to use opioids long-term, talk to your care team about driving.    DO NOT operate heavy machinery    DO NOT do any other dangerous activities while taking these medicines.    DO NOT drink any alcohol while taking these medicines.     If the opioid prescribed includes acetaminophen, DO NOT take with any other medicines that contain acetaminophen. Read all labels carefully. Look for the word  acetaminophen  or  Tylenol.  Ask your pharmacist if you have questions or are unsure.    You can get addicted to pain medicines, especially if you have a history of addiction (chemical, alcohol or substance dependence). Talk to your care team about ways to reduce this risk.    All opioids tend to cause constipation. Drink plenty of water and eat foods that have a lot of fiber, such as fruits, vegetables, prune juice, apple juice and high-fiber cereal. Take a laxative (Miralax, milk of magnesia, Colace, Senna) if you don t move your bowels at least every other day. Other side effects include upset stomach, sleepiness, dizziness, throwing up, tolerance (needing more of the medicine to have the same effect), physical dependence and slowed breathing.    Store your pills in a secure place, locked if possible. We will not replace any lost or stolen medicine. If you don t finish your medicine, please throw away (dispose) as directed by your pharmacist. The [place] [employer]ution [employer] has more information about safe  disposal: https://www.pca.[facility].[place].us/living-green/managing-unwanted-medications       Additional Information About Your Visit       Care EveryWhere ID    This is your Care EveryWhere ID. This could be used by other organizations to access your [facility] medical records  [id number]       Your Vitals Were  Most recent update: 11/12/2019  9:52 AM    Blood Pressure   121/98    Pulse   82    Temperature   97.9  F (36.6  C) (Oral)    Respirations   14    Pulse Oximetry   97%          Primary Care Provider Office Phone # Fax #    Benjie Ramos [phone]      Equal Access to Services    [facility]: Hadii aad jeison hadasho Soomaali, waaxda luqadaha, qaybta kaalmada adejordanyada, denver escalante . So [facility] [phone].    ATENCIÓN: Si habla español, tiene a agrawal disposición servicios gratuitos de asistencia lingüística. Llame al [phone].    We comply with applicable federal civil rights laws and [place] laws. We do not discriminate on the basis of race, color, national origin, age, disability, sex, sexual orientation, or gender identity.           Thank you!    Thank you for choosing [facility] for your care. Our goal is always to provide you with excellent care. Hearing back from our patients is one way we can continue to improve our services. Please take a few minutes to complete the written survey that you may receive in the mail after you visit with us. Thank you!            Medication List      Medications          Morning Afternoon Evening Bedtime As Needed    cyclobenzaprine 5 MG tablet  Also known as:  FLEXERIL  INSTRUCTIONS:  Take 1-2 tablets (5-10 mg) by mouth 3 times daily as needed for muscle spasms                     oxyCODONE-acetaminophen 5-325 MG tablet  Also known as:  PERCOCET  INSTRUCTIONS:  Take 1-2 tablets by mouth every 6 hours as needed for pain

## 2019-11-12 NOTE — PROGRESS NOTES
Alert, oriented.  VSS.  Eating and drinking without problems.  Denies pain.  Band aides to neck clean dry and intact.  Will continue to monitor.

## 2019-11-12 NOTE — PRE-PROCEDURE
[facility] Procedure Note        Sedation:      Performed by: Ld Lopez MD  Authorized by: Ld Lopez MD    Pre-Procedure Assessment done at 0800.    Expected Level:  Moderate Sedation    Indication:  Sedation is required to allow for Facet Rhizotomy    Consent obtained from patient after discussing the risks, benefits and alternatives.     Review of Anesthesia/Sedation History Completed: YES     Sleep Apnea Assessment Completed: YES    PO Intake:  Appropriately NPO for procedure    ASA Class:  Class 1 - HEALTHY PATIENT    Mallampati:  Grade 1:  Soft palate, uvula, tonsillar pillars, and posterior pharyngeal wall visible    Lungs: Lungs Clear with good breath sounds bilaterally.     Heart: Normal heart sounds and rate    History and physical reviewed and no updates needed. I have reviewed the lab findings, diagnostic data, medications, and the plan for sedation. I have determined this patient to be an appropriate candidate for the planned sedation and procedure and have reassessed the patient IMMEDIATELY PRIOR to sedation and procedure.

## 2019-11-13 ENCOUNTER — TELEPHONE (OUTPATIENT)
Dept: INTERVENTIONAL RADIOLOGY/VASCULAR | Facility: HOSPITAL | Age: 44
End: 2019-11-13

## 2019-12-02 ENCOUNTER — HOSPITAL ENCOUNTER (OUTPATIENT)
Dept: INTERVENTIONAL RADIOLOGY/VASCULAR | Facility: HOSPITAL | Age: 44
Discharge: HOME | End: 2019-12-02
Attending: PHYSICAL MEDICINE & REHABILITATION | Admitting: PHYSICAL MEDICINE & REHABILITATION
Payer: COMMERCIAL

## 2019-12-02 ENCOUNTER — TELEPHONE (OUTPATIENT)
Dept: INTERVENTIONAL RADIOLOGY/VASCULAR | Facility: HOSPITAL | Age: 44
End: 2019-12-02

## 2019-12-02 DIAGNOSIS — M46.1 SACROILIITIS, NOT ELSEWHERE CLASSIFIED (H): ICD-10-CM

## 2019-12-02 PROCEDURE — 27096 INJECT SACROILIAC JOINT: CPT | Mod: TC,50

## 2019-12-02 PROCEDURE — 25000128 H RX IP 250 OP 636: Performed by: RADIOLOGY

## 2019-12-02 PROCEDURE — 25000125 ZZHC RX 250: Performed by: RADIOLOGY

## 2019-12-02 RX ORDER — TRIAMCINOLONE ACETONIDE 40 MG/ML
INJECTION, SUSPENSION INTRA-ARTICULAR; INTRAMUSCULAR
Status: DISCONTINUED
Start: 2019-12-02 | End: 2019-12-03 | Stop reason: HOSPADM

## 2019-12-02 RX ORDER — LIDOCAINE HYDROCHLORIDE 10 MG/ML
INJECTION, SOLUTION EPIDURAL; INFILTRATION; INTRACAUDAL; PERINEURAL
Status: DISCONTINUED
Start: 2019-12-02 | End: 2019-12-03 | Stop reason: HOSPADM

## 2019-12-02 RX ORDER — IOPAMIDOL 612 MG/ML
50 INJECTION, SOLUTION INTRAVASCULAR ONCE
Status: DISCONTINUED | OUTPATIENT
Start: 2019-12-02 | End: 2019-12-02 | Stop reason: CLARIF

## 2019-12-02 RX ORDER — TRIAMCINOLONE ACETONIDE 40 MG/ML
80 INJECTION, SUSPENSION INTRA-ARTICULAR; INTRAMUSCULAR ONCE
Status: COMPLETED | OUTPATIENT
Start: 2019-12-02 | End: 2019-12-02

## 2019-12-02 RX ADMIN — LIDOCAINE HYDROCHLORIDE 10 ML: 10 INJECTION, SOLUTION EPIDURAL; INFILTRATION; INTRACAUDAL; PERINEURAL at 15:52

## 2019-12-02 RX ADMIN — TRIAMCINOLONE ACETONIDE 80 MG: 40 INJECTION, SUSPENSION INTRA-ARTICULAR; INTRAMUSCULAR at 15:52

## 2019-12-02 NOTE — DISCHARGE INSTRUCTIONS
Home number on file [phone] (home)  Is it ok to leave a message at this number(s)? Yes    Dr Hurd completed your procedure on 12/2/2019.    Current Pain Level (0-10 Scale):     RT   5/10     LT  3/10  Post Pain Level (0-10):  0/10    Radiology Discharge instructions for Steroid Injection    Activity Level:     Do not do any heavy activity or exercise for 24 hours.   Do not drive for 4 hours after your injection.  Diet:   Return to your normal diet.  Medications:   If you have stopped taking your Aspirin, Coumadin/Warfarin, Ibuprofen, or any   other blood thinner for this procedure you may resume in the morning unless   your primary care provider has given you other instructions.    Diabetics may see an increase in blood sugar after steroid injections. If you are concerned about your blood sugar, please contact your family doctor.    Site Care:  Remove the bandage and bathe or shower the morning after the procedure.      Please allow two weeks to experience improvement in your pain.  If you have any further issues, please contact your provider.    Call your Primary Care Provider if you have the following (if your primary care provider is not available please seek emergency care):   Nausea with vomiting   Severe headache   Drowsiness or confusion   Redness or drainage at the injection or puncture site   Temperature over 101 degrees F   Other concerns   Worsening back pain   Stiff neck

## 2019-12-02 NOTE — IP AVS SNAPSHOT
HI INTERVENTIONAL RAD  [address] [address]  Phone:  [phone]  Fax:  [phone]                                    After Visit Summary   12/2/2019    Samuel Barvo    MRN: [id number]           After Visit Summary Signature Page    I have received my discharge instructions, and my questions have been answered. I have discussed any challenges I see with this plan with the nurse or doctor.    ..........................................................................................................................................  Patient/Patient Representative Signature      ..........................................................................................................................................  Patient Representative Print Name and Relationship to Patient    ..................................................               ................................................  Date                                   Time    ..........................................................................................................................................  Reviewed by Signature/Title    ...................................................              ..............................................  Date                                               Time          22EPIC Rev 08/18

## 2019-12-02 NOTE — TELEPHONE ENCOUNTER
Patient was requesting  for injection he was having done today. Called patient to see if they were okay seeing  due to  leaving early today. Patient was okay with this.

## 2019-12-02 NOTE — IP AVS SNAPSHOT
MRN:[id number]                      After Visit Summary   12/2/2019    Samuel Bravo    MRN: [id number]           Visit Information        Provider Department      12/2/2019  3:30 PM HIIRRAD; HIIR1 HI INTERVENTIONAL RAD           Review of your medicines      UNREVIEWED medicines. Ask your doctor about these medicines       Dose / Directions   cyclobenzaprine 5 MG tablet  Commonly known as:  FLEXERIL  Used for:  Spondylosis of cervical region without myelopathy or radiculopathy  Ask about: Should I take this medication?      Dose:  5-10 mg  Take 1-2 tablets (5-10 mg) by mouth 3 times daily as needed for muscle spasms  Quantity:  30 tablet  Refills:  1     oxyCODONE-acetaminophen 5-325 MG tablet  Commonly known as:  PERCOCET  Used for:  Spondylosis of cervical region without myelopathy or radiculopathy  Ask about: Should I take this medication?      Dose:  1-2 tablet  Take 1-2 tablets by mouth every 6 hours as needed for pain  Quantity:  40 tablet  Refills:  0              Protect others around you: Learn how to safely use, store and throw away your medicines at www.disposemymeds.org.       Follow-ups after your visit       Care Instructions       Further instructions from your care team       Home number on file [phone] (home)  Is it ok to leave a message at this number(s)? Yes    Dr Hurd completed your procedure on 12/2/2019.    Current Pain Level (0-10 Scale):     RT   5/10     LT  3/10  Post Pain Level (0-10):  0/10    Radiology Discharge instructions for Steroid Injection    Activity Level:     Do not do any heavy activity or exercise for 24 hours.   Do not drive for 4 hours after your injection.  Diet:   Return to your normal diet.  Medications:   If you have stopped taking your Aspirin, Coumadin/Warfarin, Ibuprofen, or any   other blood thinner for this procedure you may resume in the morning unless   your primary care provider has given you other instructions.    Diabetics  may see an increase in blood sugar after steroid injections. If you are concerned about your blood sugar, please contact your family doctor.    Site Care:  Remove the bandage and bathe or shower the morning after the procedure.      Please allow two weeks to experience improvement in your pain.  If you have any further issues, please contact your provider.    Call your Primary Care Provider if you have the following (if your primary care provider is not available please seek emergency care):   Nausea with vomiting   Severe headache   Drowsiness or confusion   Redness or drainage at the injection or puncture site   Temperature over 101 degrees F   Other concerns   Worsening back pain   Stiff neck          Additional Information About Your Visit       Care EveryWhere ID    This is your Care EveryWhere ID. This could be used by other organizations to access your [facility] medical records  [id number]        Primary Care Provider Office Phone # Fax #    Benjie Ramos [phone]      Equal Access to Services    [facility] : Hadii aad ku hadasho Sodiana, waaxda luqadaha, qaybta kaalmada adejordanyagadiel, denver escalante . So [facility] [phone].    ATENCIÓN: Si habla español, tiene a agrawal disposición servicios gratuitos de asistencia lingüística. Chris al [phone].    We comply with applicable federal and state civil rights laws, including the [place] Human Rights Act. We do not discriminate on the basis of race, color, creed, Yazidism, national origin, marital status, age, disability, sex, sexual orientation, or gender identity.       Thank you!    Thank you for choosing [facility] for your care. Our goal is always to provide you with excellent care. Hearing back from our patients is one way we can continue to improve our services. Please take a few minutes to complete the written survey that you may receive in the mail after you visit with us. Thank you!            Medication List       ASK your doctor about these medications          Morning Afternoon Evening Bedtime As Needed    cyclobenzaprine 5 MG tablet  Also known as:  FLEXERIL  INSTRUCTIONS:  Take 1-2 tablets (5-10 mg) by mouth 3 times daily as needed for muscle spasms  Ask about: Should I take this medication?                     oxyCODONE-acetaminophen 5-325 MG tablet  Also known as:  PERCOCET  INSTRUCTIONS:  Take 1-2 tablets by mouth every 6 hours as needed for pain  Ask about: Should I take this medication?

## 2019-12-16 ENCOUNTER — TELEPHONE (OUTPATIENT)
Dept: INTERVENTIONAL RADIOLOGY/VASCULAR | Facility: HOSPITAL | Age: 44
End: 2019-12-16

## 2019-12-16 NOTE — TELEPHONE ENCOUNTER
INJECTION POST CALL    Procedure: Epidural, bilateral SI joint injection  Radiologist(s): Dr. Fernando Hurd   Date of Procedure:  12/2/19    Pre-procedure pain score was: 5 on right, 3 on left (See pre-procedure score)  Post-procedure pain score as of today is: 2 on right, 1 on left.  Percentage of pain improvement today?  60 % better on right and 30 % better on the left  Where is the pain located? Low back  Is this new pain? No  Would you like to be scheduled for an additional injection?  No, not at this time.  Encouraged to speak with Dr. Temple if he feels he wants another one      I responded to the patient's questions/concerns.    Patient will contact the provider if there are any issues.      Erma Dey RN

## 2020-01-03 ENCOUNTER — TELEPHONE (OUTPATIENT)
Dept: INTERVENTIONAL RADIOLOGY/VASCULAR | Facility: HOSPITAL | Age: 45
End: 2020-01-03

## 2020-01-03 NOTE — TELEPHONE ENCOUNTER
"3 months POST RHIZOTOMY CALL    Procedure: left Cervical Rhizotomy.  Radiologist(s): Dr. Ld Lopez  Date of Procedure: 09/25/2019    The patient had no questions or concerns.    Post-procedure pain score as of today is: 3, which is tolerable for him.      Where is the pain? Right side of neck.  Patient describes it as if there is a \"red hot needle inside and that causes a deep itch that he can't get to.\"    Is this new pain? No    Erma Dey RN  "

## 2020-02-21 ENCOUNTER — TELEPHONE (OUTPATIENT)
Dept: INTERVENTIONAL RADIOLOGY/VASCULAR | Facility: HOSPITAL | Age: 45
End: 2020-02-21

## 2020-02-21 NOTE — TELEPHONE ENCOUNTER
3 months POST RHIZOTOMY CALL    Procedure: right Cervical Rhizotomy, right  C4-5 and C5-6 facet joints  Radiologist(s): Dr. Ld Lopez  Date of Procedure: 11/12/2019    I responded to the patient's questions/concerns.  He does note on occasion that he will just drop items.  This is in both hands and not primarily on the right side.      Post-procedure pain score as of today is: 2    Where is the pain? Right neck.   Is this new pain? No    Erma Dey RN

## 2020-03-23 ENCOUNTER — TELEPHONE (OUTPATIENT)
Dept: INTERVENTIONAL RADIOLOGY/VASCULAR | Facility: HOSPITAL | Age: 45
End: 2020-03-23

## 2020-03-23 NOTE — TELEPHONE ENCOUNTER
6 months POST RHIZOTOMY CALL    Procedure: left Cervical Rhizotomy. C4-5 and C5-6  Radiologist(s): Dr. Ld Lopez  Date of Procedure: 9-25-19    The patient had no questions or concerns.    Post-procedure pain score as of today is: 0-3 Pain fades and is on and off, but is low, tolerable.    Where is the pain? Aches on the left side of the neck and does not radiate anywhere. Aches fade on and off reaching a 3, which is not that bad for him. Patient notes he is currently still dropping things with both hands and the burning itching feeling has subsided in the azra.      Is this new pain? No    Mireya Deana

## 2020-05-18 ENCOUNTER — TELEPHONE (OUTPATIENT)
Dept: INTERVENTIONAL RADIOLOGY/VASCULAR | Facility: HOSPITAL | Age: 45
End: 2020-05-18

## 2020-05-18 NOTE — TELEPHONE ENCOUNTER
6 months POST RHIZOTOMY CALL    Procedure: right Cervical Rhizotomy.  Radiologist(s): Dr. Ld Lopez  Date of Procedure: 11/12/19    I responded to the patient's questions/concerns.    Pre-procedure pain on day of procedure is could be a 6 or 7 patient reported  Post-procedure pain score as of today is: 0    Where is the pain? Upper neck  Does the pain radiate anywhere? no    Is this new pain? No    Do you feel this procedure was beneficial? Yes, very much so.  Pt reports that within the last month he said he is having days where if he turns his neck just so it hurts and that he almost feels like the nerves are reactivating.  Pt states he thinks he is ready for a repeat when we are up and doing them again.      How long did you feel relief  and what percentage relief do you feel you have gotten pt states at least 60%    When did you pain start returning within the last month and pt states it is sporadic, after one day of feeling like it is painful he will be fine the next.        Lilian Tran RN

## 2020-05-31 ENCOUNTER — HOSPITAL ENCOUNTER (EMERGENCY)
Facility: HOSPITAL | Age: 45
Discharge: HOME | End: 2020-05-31
Attending: NURSE PRACTITIONER | Admitting: NURSE PRACTITIONER
Payer: COMMERCIAL

## 2020-05-31 ENCOUNTER — APPOINTMENT (OUTPATIENT)
Dept: GENERAL RADIOLOGY | Facility: HOSPITAL | Age: 45
End: 2020-05-31
Attending: NURSE PRACTITIONER
Payer: COMMERCIAL

## 2020-05-31 VITALS
SYSTOLIC BLOOD PRESSURE: 149 MMHG | DIASTOLIC BLOOD PRESSURE: 104 MMHG | TEMPERATURE: 100 F | OXYGEN SATURATION: 95 % | RESPIRATION RATE: 22 BRPM

## 2020-05-31 DIAGNOSIS — F41.9 ANXIETY: ICD-10-CM

## 2020-05-31 DIAGNOSIS — S69.91XA HAND INJURY, RIGHT, INITIAL ENCOUNTER: ICD-10-CM

## 2020-05-31 DIAGNOSIS — Z73.0 BURNOUT OF CAREGIVER: ICD-10-CM

## 2020-05-31 PROCEDURE — 25000132 ZZH RX MED GY IP 250 OP 250 PS 637: Performed by: NURSE PRACTITIONER

## 2020-05-31 PROCEDURE — 99283 EMERGENCY DEPT VISIT LOW MDM: CPT

## 2020-05-31 PROCEDURE — 73130 X-RAY EXAM OF HAND: CPT | Mod: TC,RT

## 2020-05-31 PROCEDURE — 99283 EMERGENCY DEPT VISIT LOW MDM: CPT | Mod: Z6 | Performed by: NURSE PRACTITIONER

## 2020-05-31 RX ORDER — IBUPROFEN 800 MG/1
800 TABLET, FILM COATED ORAL ONCE
Status: COMPLETED | OUTPATIENT
Start: 2020-05-31 | End: 2020-05-31

## 2020-05-31 RX ORDER — ACETAMINOPHEN 325 MG/1
975 TABLET ORAL ONCE
Status: COMPLETED | OUTPATIENT
Start: 2020-05-31 | End: 2020-05-31

## 2020-05-31 RX ADMIN — ACETAMINOPHEN 975 MG: 325 TABLET, FILM COATED ORAL at 13:52

## 2020-05-31 RX ADMIN — IBUPROFEN 800 MG: 800 TABLET ORAL at 13:52

## 2020-05-31 ASSESSMENT — ENCOUNTER SYMPTOMS
HEMATURIA: 0
NAUSEA: 0
LIGHT-HEADEDNESS: 0
DYSURIA: 0
HEADACHES: 0
SORE THROAT: 0
SHORTNESS OF BREATH: 0
COUGH: 0
CHILLS: 0
DIZZINESS: 0
DIARRHEA: 0
ABDOMINAL PAIN: 0
ARTHRALGIAS: 1
VOMITING: 0
NUMBNESS: 0
PALPITATIONS: 0
DIFFICULTY URINATING: 0
JOINT SWELLING: 1
FEVER: 0

## 2020-05-31 NOTE — ED AVS SNAPSHOT
[facility]  [address]  [place] [address]  Phone:  [phone]                                    Samuel Bravo   MRN: [id number]    Department:  [facility]   Date of Visit:  5/31/2020           After Visit Summary Signature Page    I have received my discharge instructions, and my questions have been answered. I have discussed any challenges I see with this plan with the nurse or doctor.    ..........................................................................................................................................  Patient/Patient Representative Signature      ..........................................................................................................................................  Patient Representative Print Name and Relationship to Patient    ..................................................               ................................................  Date                                   Time    ..........................................................................................................................................  Reviewed by Signature/Title    ...................................................              ..............................................  Date                                               Time          22EPIC Rev 08/18

## 2020-05-31 NOTE — DISCHARGE INSTRUCTIONS
(S69.91XA) Hand injury, right, initial encounter  (F41.9) Anxiety  (Z73.0) Burnout of caregiver  45-year old male presents to the ED with concerns of right hand injury after punching a wall today. He has hematoma over 4th/5th MCP joint, abrasion over 4th MCP joint, and 4th PIP joint.   Xray - negative for acute fracture  Recommend:  - ice over the next 72 hours. Can use heat and/or ice after that  - elevation of right hand  - acetaminophen 1,000 mg three times daily  - ibuprofen (Advil) 800 mg with food every 8 hours.  *alternate acetaminophen and ibuprofen. Ex: 8 am ibuprofen, 12 pm acetaminophen, 4 pm ibuprofen, 8 pm acetaminophen, etc.*    He also is having anxiety and caregiver burnout due to being his dad's primary caregiver for the last 3 years and family issues with his siblings. He declines DEC assessment. Recommend he follow-up with his primary care giver regarding this. Social service referral placed to help him with resources as he says he no longer wants to be his father's primary care provider/POA.     RETURN TO THE ED WITH NEW OR WORSENING SYMPTOMS.    FOLLOW-UP WITH YOUR PRIMARY CARE PROVIDER IN 7-10 DAYS.      Wen Brice CNP      Results for orders placed or performed during the hospital encounter of 05/31/20   XR Hand Right G/E 3 Views     Status: None    Narrative    PROCEDURE:  XR HAND RT G/E 3 VW    HISTORY: punched a wall.    COMPARISON:  None.    TECHNIQUE:  3 views right hand.    FINDINGS:  No fracture or dislocation is identified. Soft tissue  swelling is noted near the second through fifth MCP joints. The joint  spaces are preserved. No foreign body is seen.       Impression    IMPRESSION: No acute fracture.      QUE AGUILAR MD

## 2020-05-31 NOTE — ED NOTES
Cleansed patient's hand, put triple antibiotic on the abrasions and applied gauze, wrapped, and ace wrapped for swelling and discomfort. Patient states it feels better already. Patient was given ice as soon as this nurse went into room initially and patient iced off and on during duration in the ER.
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
## 2020-06-22 ENCOUNTER — TELEPHONE (OUTPATIENT)
Dept: CASE MANAGEMENT | Facility: HOSPITAL | Age: 45
End: 2020-06-22

## 2020-06-22 NOTE — TELEPHONE ENCOUNTER
Writer reached Olya Medrano, care coordinator in regards to staff message received about caregiver burn out.  She is currently working with patient and family.

## 2020-07-07 ENCOUNTER — TELEPHONE (OUTPATIENT)
Dept: INTERVENTIONAL RADIOLOGY/VASCULAR | Facility: HOSPITAL | Age: 45
End: 2020-07-07

## 2020-07-09 ENCOUNTER — TELEPHONE (OUTPATIENT)
Dept: INTERVENTIONAL RADIOLOGY/VASCULAR | Facility: HOSPITAL | Age: 45
End: 2020-07-09

## 2020-07-09 NOTE — TELEPHONE ENCOUNTER
Patient wants a repeat Rhizotomy for neck would also like to have a rhizotomy done on his lower back. Called to inform patient he will need an updated MRI of the Lumbar and Cervical spine. Patient states he just moved to [facility] and is trying to establish a primary doctor there. Patient will hold off on Rhizotomy right now and will call IR if needing anything.

## 2020-07-15 ENCOUNTER — APPOINTMENT (OUTPATIENT)
Dept: CT IMAGING | Facility: HOSPITAL | Age: 45
End: 2020-07-15
Attending: PHYSICIAN ASSISTANT
Payer: COMMERCIAL

## 2020-07-15 ENCOUNTER — HOSPITAL ENCOUNTER (EMERGENCY)
Facility: HOSPITAL | Age: 45
Discharge: HOME | End: 2020-07-15
Attending: PHYSICIAN ASSISTANT | Admitting: PHYSICIAN ASSISTANT
Payer: COMMERCIAL

## 2020-07-15 VITALS
RESPIRATION RATE: 16 BRPM | DIASTOLIC BLOOD PRESSURE: 111 MMHG | TEMPERATURE: 98.1 F | OXYGEN SATURATION: 98 % | SYSTOLIC BLOOD PRESSURE: 161 MMHG | HEART RATE: 82 BPM

## 2020-07-15 DIAGNOSIS — R10.9 ABDOMINAL PAIN OF UNKNOWN ETIOLOGY: ICD-10-CM

## 2020-07-15 LAB
ALBUMIN SERPL-MCNC: 4.4 G/DL (ref 3.4–5)
ALBUMIN UR-MCNC: NEGATIVE MG/DL
ALP SERPL-CCNC: 61 U/L (ref 40–150)
ALT SERPL W P-5'-P-CCNC: 35 U/L (ref 0–70)
ANION GAP SERPL CALCULATED.3IONS-SCNC: 5 MMOL/L (ref 3–14)
APPEARANCE UR: CLEAR
AST SERPL W P-5'-P-CCNC: 21 U/L (ref 0–45)
BASOPHILS # BLD AUTO: 0 10E9/L (ref 0–0.2)
BASOPHILS NFR BLD AUTO: 0.3 %
BILIRUB SERPL-MCNC: 0.8 MG/DL (ref 0.2–1.3)
BILIRUB UR QL STRIP: NEGATIVE
BUN SERPL-MCNC: 12 MG/DL (ref 7–30)
CALCIUM SERPL-MCNC: 9.3 MG/DL (ref 8.5–10.1)
CHLORIDE SERPL-SCNC: 111 MMOL/L (ref 94–109)
CO2 SERPL-SCNC: 24 MMOL/L (ref 20–32)
COLOR UR AUTO: ABNORMAL
CREAT SERPL-MCNC: 0.91 MG/DL (ref 0.66–1.25)
CRP SERPL-MCNC: 2.9 MG/L (ref 0–8)
DIFFERENTIAL METHOD BLD: ABNORMAL
EOSINOPHIL # BLD AUTO: 0 10E9/L (ref 0–0.7)
EOSINOPHIL NFR BLD AUTO: 0.1 %
ERYTHROCYTE [DISTWIDTH] IN BLOOD BY AUTOMATED COUNT: 12.6 % (ref 10–15)
GFR SERPL CREATININE-BSD FRML MDRD: >90 ML/MIN/{1.73_M2}
GLUCOSE SERPL-MCNC: 118 MG/DL (ref 70–99)
GLUCOSE UR STRIP-MCNC: NEGATIVE MG/DL
HCT VFR BLD AUTO: 46.9 % (ref 40–53)
HGB BLD-MCNC: 16.5 G/DL (ref 13.3–17.7)
HGB UR QL STRIP: NEGATIVE
IMM GRANULOCYTES # BLD: 0 10E9/L (ref 0–0.4)
IMM GRANULOCYTES NFR BLD: 0.4 %
KETONES UR STRIP-MCNC: 40 MG/DL
LEUKOCYTE ESTERASE UR QL STRIP: NEGATIVE
LIPASE SERPL-CCNC: 46 U/L (ref 73–393)
LYMPHOCYTES # BLD AUTO: 0.9 10E9/L (ref 0.8–5.3)
LYMPHOCYTES NFR BLD AUTO: 8 %
MCH RBC QN AUTO: 28.8 PG (ref 26.5–33)
MCHC RBC AUTO-ENTMCNC: 35.2 G/DL (ref 31.5–36.5)
MCV RBC AUTO: 82 FL (ref 78–100)
MONOCYTES # BLD AUTO: 0.2 10E9/L (ref 0–1.3)
MONOCYTES NFR BLD AUTO: 2.2 %
NEUTROPHILS # BLD AUTO: 9.5 10E9/L (ref 1.6–8.3)
NEUTROPHILS NFR BLD AUTO: 89 %
NITRATE UR QL: NEGATIVE
NRBC # BLD AUTO: 0 10*3/UL
NRBC BLD AUTO-RTO: 0 /100
PH UR STRIP: 5.5 PH (ref 4.7–8)
PLATELET # BLD AUTO: 281 10E9/L (ref 150–450)
POTASSIUM SERPL-SCNC: 3.5 MMOL/L (ref 3.4–5.3)
PROT SERPL-MCNC: 7.8 G/DL (ref 6.8–8.8)
RBC # BLD AUTO: 5.73 10E12/L (ref 4.4–5.9)
SODIUM SERPL-SCNC: 140 MMOL/L (ref 133–144)
SOURCE: ABNORMAL
SP GR UR STRIP: 1.01 (ref 1–1.03)
UROBILINOGEN UR STRIP-MCNC: NORMAL MG/DL (ref 0–2)
WBC # BLD AUTO: 10.7 10E9/L (ref 4–11)

## 2020-07-15 PROCEDURE — 83690 ASSAY OF LIPASE: CPT | Performed by: PHYSICIAN ASSISTANT

## 2020-07-15 PROCEDURE — 96375 TX/PRO/DX INJ NEW DRUG ADDON: CPT

## 2020-07-15 PROCEDURE — 85025 COMPLETE CBC W/AUTO DIFF WBC: CPT | Performed by: PHYSICIAN ASSISTANT

## 2020-07-15 PROCEDURE — 96376 TX/PRO/DX INJ SAME DRUG ADON: CPT

## 2020-07-15 PROCEDURE — 96361 HYDRATE IV INFUSION ADD-ON: CPT

## 2020-07-15 PROCEDURE — 36415 COLL VENOUS BLD VENIPUNCTURE: CPT | Performed by: PHYSICIAN ASSISTANT

## 2020-07-15 PROCEDURE — 25000132 ZZH RX MED GY IP 250 OP 250 PS 637: Performed by: PHYSICIAN ASSISTANT

## 2020-07-15 PROCEDURE — 25800030 ZZH RX IP 258 OP 636: Performed by: PHYSICIAN ASSISTANT

## 2020-07-15 PROCEDURE — 99284 EMERGENCY DEPT VISIT MOD MDM: CPT | Mod: Z6 | Performed by: PHYSICIAN ASSISTANT

## 2020-07-15 PROCEDURE — 86140 C-REACTIVE PROTEIN: CPT | Performed by: PHYSICIAN ASSISTANT

## 2020-07-15 PROCEDURE — 99285 EMERGENCY DEPT VISIT HI MDM: CPT | Mod: 25

## 2020-07-15 PROCEDURE — 80053 COMPREHEN METABOLIC PANEL: CPT | Performed by: PHYSICIAN ASSISTANT

## 2020-07-15 PROCEDURE — 74177 CT ABD & PELVIS W/CONTRAST: CPT | Mod: TC

## 2020-07-15 PROCEDURE — 25000128 H RX IP 250 OP 636: Performed by: PHYSICIAN ASSISTANT

## 2020-07-15 PROCEDURE — 25500064 ZZH RX 255 OP 636: Performed by: RADIOLOGY

## 2020-07-15 PROCEDURE — 96374 THER/PROPH/DIAG INJ IV PUSH: CPT | Mod: XU

## 2020-07-15 PROCEDURE — 81003 URINALYSIS AUTO W/O SCOPE: CPT | Performed by: PHYSICIAN ASSISTANT

## 2020-07-15 RX ORDER — IOPAMIDOL 612 MG/ML
100 INJECTION, SOLUTION INTRAVASCULAR ONCE
Status: COMPLETED | OUTPATIENT
Start: 2020-07-15 | End: 2020-07-15

## 2020-07-15 RX ORDER — ONDANSETRON 2 MG/ML
4 INJECTION INTRAMUSCULAR; INTRAVENOUS EVERY 30 MIN PRN
Status: DISCONTINUED | OUTPATIENT
Start: 2020-07-15 | End: 2020-07-15 | Stop reason: HOSPADM

## 2020-07-15 RX ORDER — METOCLOPRAMIDE 10 MG/1
10 TABLET ORAL
COMMUNITY

## 2020-07-15 RX ORDER — OLANZAPINE 5 MG/1
10 TABLET, ORALLY DISINTEGRATING ORAL ONCE
Status: COMPLETED | OUTPATIENT
Start: 2020-07-15 | End: 2020-07-15

## 2020-07-15 RX ORDER — KETOROLAC TROMETHAMINE 30 MG/ML
30 INJECTION, SOLUTION INTRAMUSCULAR; INTRAVENOUS ONCE
Status: COMPLETED | OUTPATIENT
Start: 2020-07-15 | End: 2020-07-15

## 2020-07-15 RX ADMIN — ONDANSETRON 4 MG: 2 INJECTION INTRAMUSCULAR; INTRAVENOUS at 11:45

## 2020-07-15 RX ADMIN — HYDROMORPHONE HYDROCHLORIDE 1 MG: 1 INJECTION, SOLUTION INTRAMUSCULAR; INTRAVENOUS; SUBCUTANEOUS at 13:44

## 2020-07-15 RX ADMIN — OLANZAPINE 10 MG: 5 TABLET, ORALLY DISINTEGRATING ORAL at 13:02

## 2020-07-15 RX ADMIN — SODIUM CHLORIDE 1000 ML: 9 INJECTION, SOLUTION INTRAVENOUS at 11:28

## 2020-07-15 RX ADMIN — IOPAMIDOL 100 ML: 612 INJECTION, SOLUTION INTRAVENOUS at 12:06

## 2020-07-15 RX ADMIN — KETOROLAC TROMETHAMINE 30 MG: 30 INJECTION, SOLUTION INTRAMUSCULAR at 11:45

## 2020-07-15 RX ADMIN — ONDANSETRON 4 MG: 2 INJECTION INTRAMUSCULAR; INTRAVENOUS at 13:51

## 2020-07-15 RX ADMIN — ONDANSETRON 4 MG: 2 INJECTION INTRAMUSCULAR; INTRAVENOUS at 11:28

## 2020-07-15 ASSESSMENT — ENCOUNTER SYMPTOMS
CHEST TIGHTNESS: 0
CHILLS: 0
NAUSEA: 1
BACK PAIN: 0
VOMITING: 1
ACTIVITY CHANGE: 0
FEVER: 0
SHORTNESS OF BREATH: 0
APPETITE CHANGE: 1
FATIGUE: 0
ABDOMINAL PAIN: 1

## 2020-07-15 NOTE — DISCHARGE INSTRUCTIONS
What to expect when you have contrast    During your exam, we will inject  contrast  into your vein or artery. (Contrast is a clear liquid with iodine in it. It shows up on X-rays.)    You may feel warm or hot. You may have a metal taste in your mouth and a slight upset stomach. You may also feel pressure near the kidneys and bladder. These effects will last about 1 to 3 minutes.    Please tell us if you have:   Sneezing    Itching   Hives    Swelling in the face   A hoarse voice   Breathing problems   Other new symptoms    Serious problems are rare.  They may include:   Irregular heartbeat    Seizures   Kidney failure             Tissue damage   Shock     Death    If you have any problems during the exam, we  will treat them right away.    When you get home    Call your hospital if you have any new symptoms in the next 2 days, like hives or swelling. (Phone numbers are at the bottom of this page.) Or call your family doctor.     If you have wheezing or trouble breathing, call 911.    Self-care  -Drink at least 4 extra glasses of water today.   This reduces the stress on your kidneys.  -Keep taking your regular medicines.    The contrast will pass out of your body in your  Urine(pee). This will happen in the next 24 hours. You  will not feel this. Your urine will not  change color.    If you have kidney problems or take metformin    Drink 4 to 8 large glasses of water for the next  2 days, if you are not on a fluid restriction.    ?If you take metformin (Glucophage or Glucovance) for diabetes, keep taking it.      ?Your kidney function tests are abnormal.  If you take Metformin, do not take it for 48 hours. Please go to your clinic for a blood test within 3 days after your exam before the restarting this medicine.     (Note to provider:please give patient prescription for lab tests.)    ?Special instructions: -    I have read and understand the above information.    Patient Sign  Here:______________________________________Date:________Time:______    Staff Sign Here:________________________________________Date:_______Time:______      Radiology Departments:     ?[facility]: [phone] ?[facility]: [phone]     ?[facility] [place] [facility]: [phone] ?[facility]:[phone]      ?[place]: [phone]  ?[facility]: [phone]  ?[place]:[phone]    ?[facility]:[phone]  ?[facility]:[phone]    Your work-up today was unrevealing for an emergency cause of your symptoms.    Follow-up in the clinic this week.    Advance diet as tolerated.    Return here as needed.

## 2020-07-15 NOTE — ED NOTES
"Pt shouting and upset stating \"what do I have to do the next time I come in here bring a  so I can get some pain control.\" Pt informed that we have given him both Toradol and Zyprexa for the pain. Pt upset about this.   "

## 2020-07-15 NOTE — ED NOTES
Pt ambulatory to ED room 11 with c/o mid abd pain and cyclic vomiting intermittently x11 days. Pt states that he was seen recently at [facility] in [place] and Rx'd metoclopramide, which he was unable to keep down today. Pt states that he is living in [place] for PTSD treatment. Pt states that he smokes marijuana and started smoking more pot 3 days ago when his PTSD worsened. Last used 3 days ago. No blood in emesis and loose stools today, which pt states is not abnormal for him. Pt does have appendix.

## 2020-07-15 NOTE — ED PROVIDER NOTES
History     Chief Complaint   Patient presents with     Abdominal Pain     The history is provided by the patient.     Samuel Bravo is a 45 year old male who presented to the emergency department ambulatory for evaluation of approximate 6-hour history of abdominal pain and vomiting.  He has a history of cyclic vomiting syndrome.  Reports the last time he smoked marijuana was 3 days ago.  Denies any fevers or chills.  Denies any hematochezia, melena, hematemesis.  Denies any lower urinary tract symptoms.  Pain is in the epigastric and right upper quadrant.  Described as an 8 on the 10 scale.  Colicky in nature.  Denies any radiation of the pain.  Denies any chest pain.    Allergies:  Allergies   Allergen Reactions     Codeine      Penicillins        Problem List:    There are no active problems to display for this patient.       Past Medical History:    Past Medical History:   Diagnosis Date     Alcohol abuse, in remission 08/03/2012     Anabolic steroid abuse 08/03/2012     Anxiety state, unspecified 08/03/2012     Cannabis abuse, unspecified 08/03/2012     Depressive disorder, not elsewhere classified 08/03/2012     Obstructive sleep apnea (adult) (pediatric) 04/01/2011     Tobacco use disorder 04/01/2011     Umbilical hernia without mention of obstruction or gangrene 06/25/2008       Past Surgical History:    Past Surgical History:   Procedure Laterality Date     ESOPHAGOSCOPY, GASTROSCOPY, DUODENOSCOPY (EGD), COMBINED N/A 5/30/2019    Procedure: UPPER ENDOSCOPY with BIOPSY;  Surgeon: Genaro Ray MD;  Location: [facility]     [facility] SUPERFICIAL CAUTERY BILATERAL MIDDLE TURBINATES  11/2011    turbinate reduction, Bilateral      HERNIA REPAIR, UMBILICAL  02/2009     I&D PERIANAL ABSCESS,SUPERFICIAL      EUA, and unroofing of perianal abscess     SEPTOPLASTY  11/2011     TONSILLECTOMY  1999     UVULOPALATOPHARYNGOPLASTY  11/2011       Family History:    Family History   Problem Relation Age of Onset      Heart Disease Father         atrial fibrillation, probably with a small MI      Colon Polyps Father      Other - See Comments Father         tobacco use      Breast Cancer Mother      Cancer Mother         lung, (cause of death)      Chronic Obstructive Pulmonary Disease Mother      Cancer - colorectal Paternal Grandfather      Substance Abuse Brother      Substance Abuse Brother        Social History:  Marital Status:  Single [1]  Social History     Tobacco Use     Smoking status: Former Smoker     Packs/day: 1.00     Years: 25.00     Pack years: 25.00     Types: Cigarettes     Last attempt to quit: 2012     Years since quittin.1     Smokeless tobacco: Former User     Tobacco comment: Passive Exposure: No; Longest Tobacco Free: 3 months    Substance Use Topics     Alcohol use: No     Comment: Former,      Drug use: Yes     Types: Marijuana        Medications:    metoclopramide (REGLAN) 10 MG tablet          Review of Systems   Constitutional: Positive for appetite change. Negative for activity change, chills, fatigue and fever.   Respiratory: Negative for chest tightness and shortness of breath.    Cardiovascular: Negative for chest pain.   Gastrointestinal: Positive for abdominal pain, nausea and vomiting.   Genitourinary: Negative.    Musculoskeletal: Negative for back pain.   Skin: Negative.        Physical Exam   BP: (!) 165/102  Pulse: 82  Heart Rate: 88  Temp: 98.2  F (36.8  C)  Resp: 18  SpO2: 97 %      Physical Exam  Vitals signs and nursing note reviewed.   Constitutional:       General: He is not in acute distress.     Appearance: Normal appearance. He is normal weight. He is not ill-appearing, toxic-appearing or diaphoretic.   Cardiovascular:      Rate and Rhythm: Normal rate and regular rhythm.   Pulmonary:      Breath sounds: Normal breath sounds.   Abdominal:      General: There is no distension.      Palpations: Abdomen is soft.      Tenderness: There is abdominal tenderness. There is no  guarding.       Skin:     General: Skin is warm and dry.      Capillary Refill: Capillary refill takes less than 2 seconds.   Neurological:      General: No focal deficit present.      Mental Status: He is alert and oriented to person, place, and time.   Psychiatric:         Mood and Affect: Mood normal.         ED Course        Procedures               Critical Care time:  none               Results for orders placed or performed during the hospital encounter of 07/15/20 (from the past 24 hour(s))   CBC with platelets differential   Result Value Ref Range    WBC 10.7 4.0 - 11.0 10e9/L    RBC Count 5.73 4.4 - 5.9 10e12/L    Hemoglobin 16.5 13.3 - 17.7 g/dL    Hematocrit 46.9 40.0 - 53.0 %    MCV 82 78 - 100 fl    MCH 28.8 26.5 - 33.0 pg    MCHC 35.2 31.5 - 36.5 g/dL    RDW 12.6 10.0 - 15.0 %    Platelet Count 281 150 - 450 10e9/L    Diff Method Automated Method     % Neutrophils 89.0 %    % Lymphocytes 8.0 %    % Monocytes 2.2 %    % Eosinophils 0.1 %    % Basophils 0.3 %    % Immature Granulocytes 0.4 %    Nucleated RBCs 0 0 /100    Absolute Neutrophil 9.5 (H) 1.6 - 8.3 10e9/L    Absolute Lymphocytes 0.9 0.8 - 5.3 10e9/L    Absolute Monocytes 0.2 0.0 - 1.3 10e9/L    Absolute Eosinophils 0.0 0.0 - 0.7 10e9/L    Absolute Basophils 0.0 0.0 - 0.2 10e9/L    Abs Immature Granulocytes 0.0 0 - 0.4 10e9/L    Absolute Nucleated RBC 0.0    Comprehensive metabolic panel   Result Value Ref Range    Sodium 140 133 - 144 mmol/L    Potassium 3.5 3.4 - 5.3 mmol/L    Chloride 111 (H) 94 - 109 mmol/L    Carbon Dioxide 24 20 - 32 mmol/L    Anion Gap 5 3 - 14 mmol/L    Glucose 118 (H) 70 - 99 mg/dL    Urea Nitrogen 12 7 - 30 mg/dL    Creatinine 0.91 0.66 - 1.25 mg/dL    GFR Estimate >90 >60 mL/min/[1.73_m2]    GFR Estimate If Black >90 >60 mL/min/[1.73_m2]    Calcium 9.3 8.5 - 10.1 mg/dL    Bilirubin Total 0.8 0.2 - 1.3 mg/dL    Albumin 4.4 3.4 - 5.0 g/dL    Protein Total 7.8 6.8 - 8.8 g/dL    Alkaline Phosphatase 61 40 - 150 U/L     ALT 35 0 - 70 U/L    AST 21 0 - 45 U/L   Lipase   Result Value Ref Range    Lipase 46 (L) 73 - 393 U/L   CRP inflammation   Result Value Ref Range    CRP Inflammation 2.9 0.0 - 8.0 mg/L   CT Abdomen Pelvis w Contrast    Narrative    PROCEDURE:  CT ABDOMEN PELVIS W CONTRAST    HISTORY: persistent upper abdominal pain and vomiting    TECHNIQUE:  Helical CT of the abdomen and pelvis was performed  following injection of intravenous contrast.      COMPARISON:  5/24/2019    MEDS/CONTRAST: ISOVUE 300  100ML    FINDINGS:      Limited images through the lung bases demonstrate no focal  consolidation or mass.  The heart size is normal. No pericardial or  pleural effusions are seen.    The liver demonstrates no mass or intrahepatic biliary ductal  dilatation. The gallbladder, spleen, pancreas and adrenal glands are  unremarkable. Symmetric nephrograms are present without  hydronephrosis. There is no abdominal aortic aneurysm.    The bowel is normal in caliber. The appendix is normal.    No free fluid, free air or adenopathy is present.  No suspicious  osseous lesions are identified.      Impression    IMPRESSION:      No finding to account for acute abdominal pain.    QUE AGUILAR MD   UA reflex to Microscopic and Culture    Specimen: Midstream Urine   Result Value Ref Range    Color Urine Light Yellow     Appearance Urine Clear     Glucose Urine Negative NEG^Negative mg/dL    Bilirubin Urine Negative NEG^Negative    Ketones Urine 40 (A) NEG^Negative mg/dL    Specific Gravity Urine 1.015 1.003 - 1.035    Blood Urine Negative NEG^Negative    pH Urine 5.5 4.7 - 8.0 pH    Protein Albumin Urine Negative NEG^Negative mg/dL    Urobilinogen mg/dL Normal 0.0 - 2.0 mg/dL    Nitrite Urine Negative NEG^Negative    Leukocyte Esterase Urine Negative NEG^Negative    Source Midstream Urine        Medications   ondansetron (ZOFRAN) injection 4 mg (4 mg Intravenous Given 7/15/20 1128)   ondansetron (ZOFRAN) injection 4 mg (4 mg  Intravenous Given 7/15/20 1145)   0.9% sodium chloride BOLUS (0 mLs Intravenous Stopped 7/15/20 1247)   ketorolac (TORADOL) injection 30 mg (30 mg Intravenous Given 7/15/20 1145)   iopamidol (ISOVUE-300) IV solution 61% 100 mL (100 mLs Intravenous Given 7/15/20 1206)   sodium chloride (PF) 0.9% PF flush 60 mL (60 mLs Intravenous Given 7/15/20 1206)   OLANZapine zydis (zyPREXA) ODT tab 10 mg (10 mg Oral Given 7/15/20 1302)   HYDROmorphone (DILAUDID) injection 1 mg (1 mg Intravenous Given 7/15/20 1344)       Assessments & Plan (with Medical Decision Making)   Broad differential was considered.  Work-up as above.  Previous emergency department visits were also reviewed.  Multiple medications provided with little relief.  Ultimately did receive a single dose of narcotic pain medications with good relief.  He was advised this would not be repeated in the emergency department.  Normal laboratory evaluation.  Normal CT scanning.  HPI, exam, symptoms, and work-up are not consistent with acute mesenteric ischemia, acute bowel obstruction, acute pancreatitis, acute appendicitis, aortic dissection, or other intra-abdominal catastrophe.  Follow-up in the clinic.  Return here as needed.    This document was prepared using a combination of typing and voice generated software.  While every attempt was made for accuracy, spelling and grammatical errors may exist.    I have reviewed the nursing notes.    I have reviewed the findings, diagnosis, plan and need for follow up with the patient.       New Prescriptions    No medications on file       Final diagnoses:   Abdominal pain of unknown etiology       7/15/2020   [facility]     Radha Munoz PA-C  07/15/20 2098

## 2020-07-15 NOTE — ED AVS SNAPSHOT
[facility]  [address]  [place] [address]  Phone:  [phone]                                    Samuel Bravo   MRN: [id number]    Department:  [facility]   Date of Visit:  7/15/2020           After Visit Summary Signature Page    I have received my discharge instructions, and my questions have been answered. I have discussed any challenges I see with this plan with the nurse or doctor.    ..........................................................................................................................................  Patient/Patient Representative Signature      ..........................................................................................................................................  Patient Representative Print Name and Relationship to Patient    ..................................................               ................................................  Date                                   Time    ..........................................................................................................................................  Reviewed by Signature/Title    ...................................................              ..............................................  Date                                               Time          22EPIC Rev 08/18

## (undated) DEVICE — CONNECTOR-ERBEFLO 2 PORT

## (undated) DEVICE — SENSOR-OXISENSOR II ADULT

## (undated) DEVICE — LUBRICANT JELLY 2OZ. TUBE

## (undated) DEVICE — MOUTHPIECE W/GUARD FOR ENDOSCOPY

## (undated) DEVICE — SYRINGE-30CC SLIP TIP

## (undated) DEVICE — APPLICATOR-CHLORAPREP 26ML TINTED CHG 2%+ 70% IPA-SURGICAL

## (undated) DEVICE — IRRIGATION-H2O 1000ML

## (undated) DEVICE — TUBING-SUCTION 20FT

## (undated) DEVICE — CANISTER-SUCTION 2000CC

## (undated) RX ORDER — PROPOFOL 10 MG/ML
INJECTION, EMULSION INTRAVENOUS
Status: DISPENSED
Start: 2019-05-30

## (undated) RX ORDER — LIDOCAINE HYDROCHLORIDE 20 MG/ML
INJECTION, SOLUTION EPIDURAL; INFILTRATION; INTRACAUDAL; PERINEURAL
Status: DISPENSED
Start: 2019-05-30